# Patient Record
Sex: MALE | Race: WHITE | NOT HISPANIC OR LATINO
[De-identification: names, ages, dates, MRNs, and addresses within clinical notes are randomized per-mention and may not be internally consistent; named-entity substitution may affect disease eponyms.]

---

## 2017-12-01 ENCOUNTER — LABORATORY RESULT (OUTPATIENT)
Age: 67
End: 2017-12-01

## 2017-12-01 ENCOUNTER — NON-APPOINTMENT (OUTPATIENT)
Age: 67
End: 2017-12-01

## 2017-12-01 ENCOUNTER — APPOINTMENT (OUTPATIENT)
Dept: INTERNAL MEDICINE | Facility: CLINIC | Age: 67
End: 2017-12-01
Payer: MEDICARE

## 2017-12-01 VITALS
DIASTOLIC BLOOD PRESSURE: 79 MMHG | WEIGHT: 188 LBS | OXYGEN SATURATION: 97 % | TEMPERATURE: 97.8 F | BODY MASS INDEX: 26.32 KG/M2 | HEIGHT: 71 IN | HEART RATE: 87 BPM | SYSTOLIC BLOOD PRESSURE: 114 MMHG

## 2017-12-01 DIAGNOSIS — F31.9 BIPOLAR DISORDER, UNSPECIFIED: ICD-10-CM

## 2017-12-01 DIAGNOSIS — L02.419 CUTANEOUS ABSCESS OF LIMB, UNSPECIFIED: ICD-10-CM

## 2017-12-01 PROCEDURE — 99214 OFFICE O/P EST MOD 30 MIN: CPT | Mod: 25

## 2017-12-01 PROCEDURE — G0402 INITIAL PREVENTIVE EXAM: CPT

## 2017-12-01 PROCEDURE — 93000 ELECTROCARDIOGRAM COMPLETE: CPT | Mod: 59

## 2017-12-01 PROCEDURE — 36415 COLL VENOUS BLD VENIPUNCTURE: CPT

## 2017-12-01 PROCEDURE — G0404: CPT

## 2017-12-03 LAB
25(OH)D3 SERPL-MCNC: 40.7 NG/ML
AMYLASE/CREAT SERPL: 66 U/L
BASOPHILS # BLD AUTO: 0.03 K/UL
BASOPHILS NFR BLD AUTO: 0.3 %
CHOLEST SERPL-MCNC: 182 MG/DL
CHOLEST/HDLC SERPL: 4.9 RATIO
EOSINOPHIL # BLD AUTO: 0.21 K/UL
EOSINOPHIL NFR BLD AUTO: 1.9 %
HBA1C MFR BLD HPLC: 5.3 %
HCT VFR BLD CALC: 42.1 %
HDLC SERPL-MCNC: 37 MG/DL
HGB BLD-MCNC: 14.1 G/DL
IMM GRANULOCYTES NFR BLD AUTO: 0.3 %
LDLC SERPL CALC-MCNC: 119 MG/DL
LITHIUM SERPL-SCNC: <.2 MMOL/L
LPL SERPL-CCNC: 28 U/L
LYMPHOCYTES # BLD AUTO: 1.74 K/UL
LYMPHOCYTES NFR BLD AUTO: 15.6 %
MAN DIFF?: NORMAL
MCHC RBC-ENTMCNC: 32.3 PG
MCHC RBC-ENTMCNC: 33.5 GM/DL
MCV RBC AUTO: 96.3 FL
MONOCYTES # BLD AUTO: 1.1 K/UL
MONOCYTES NFR BLD AUTO: 9.9 %
NEUTROPHILS # BLD AUTO: 8.02 K/UL
NEUTROPHILS NFR BLD AUTO: 72 %
PLATELET # BLD AUTO: 219 K/UL
PSA SERPL-MCNC: 1.34 NG/ML
RBC # BLD: 4.37 M/UL
RBC # FLD: 13.4 %
T4 SERPL-MCNC: 7.2 UG/DL
TRIGL SERPL-MCNC: 132 MG/DL
TSH SERPL-ACNC: 1.29 UIU/ML
URATE SERPL-MCNC: 5.4 MG/DL
WBC # FLD AUTO: 11.13 K/UL

## 2017-12-21 ENCOUNTER — RX RENEWAL (OUTPATIENT)
Age: 67
End: 2017-12-21

## 2017-12-21 RX ORDER — LITHIUM CARBONATE 300 MG/1
300 CAPSULE ORAL
Qty: 120 | Refills: 5 | Status: ACTIVE | COMMUNITY
Start: 2017-12-21 | End: 1900-01-01

## 2018-05-29 ENCOUNTER — APPOINTMENT (OUTPATIENT)
Dept: INTERNAL MEDICINE | Facility: CLINIC | Age: 68
End: 2018-05-29
Payer: MEDICARE

## 2018-05-29 VITALS
SYSTOLIC BLOOD PRESSURE: 120 MMHG | HEIGHT: 71 IN | TEMPERATURE: 98.1 F | BODY MASS INDEX: 27.58 KG/M2 | HEART RATE: 67 BPM | DIASTOLIC BLOOD PRESSURE: 78 MMHG | WEIGHT: 197 LBS

## 2018-05-29 DIAGNOSIS — M62.830 MUSCLE SPASM OF BACK: ICD-10-CM

## 2018-05-29 PROCEDURE — 99213 OFFICE O/P EST LOW 20 MIN: CPT

## 2018-05-29 NOTE — PLAN
[FreeTextEntry1] : Most likely parotiditis\par \par doxycycline 100 bid for 10 days\par hot compresses\par keep mouth moist\par \par \par if not to Head and Neck\par \par \par back spasm  local care\par

## 2018-05-29 NOTE — PHYSICAL EXAM
[No Acute Distress] : no acute distress [Well Nourished] : well nourished [No JVD] : no jugular venous distention [Supple] : supple [No Respiratory Distress] : no respiratory distress  [Clear to Auscultation] : lungs were clear to auscultation bilaterally [Normal Rate] : normal rate  [Regular Rhythm] : with a regular rhythm [Soft] : abdomen soft [No HSM] : no HSM [de-identified] : swollen left cheek with 1.5 cm nodule

## 2018-05-29 NOTE — HISTORY OF PRESENT ILLNESS
[FreeTextEntry1] : swollen left cheek [de-identified] : Swollen left cheek\par several month to a year gets better and worse\par no fever or illness\par denture issue\par \par \par back issue from lifting heavy dog

## 2018-07-10 ENCOUNTER — APPOINTMENT (OUTPATIENT)
Dept: SURGERY | Facility: CLINIC | Age: 68
End: 2018-07-10
Payer: MEDICARE

## 2018-07-10 VITALS
SYSTOLIC BLOOD PRESSURE: 172 MMHG | HEART RATE: 77 BPM | WEIGHT: 200 LBS | HEIGHT: 72 IN | DIASTOLIC BLOOD PRESSURE: 96 MMHG | BODY MASS INDEX: 27.09 KG/M2

## 2018-07-10 PROCEDURE — 99203 OFFICE O/P NEW LOW 30 MIN: CPT

## 2018-07-10 PROCEDURE — 10021 FNA BX W/O IMG GDN 1ST LES: CPT

## 2018-07-11 ENCOUNTER — LABORATORY RESULT (OUTPATIENT)
Age: 68
End: 2018-07-11

## 2018-07-17 ENCOUNTER — MOBILE ON CALL (OUTPATIENT)
Age: 68
End: 2018-07-17

## 2018-07-19 ENCOUNTER — OTHER (OUTPATIENT)
Age: 68
End: 2018-07-19

## 2018-08-06 ENCOUNTER — FORM ENCOUNTER (OUTPATIENT)
Age: 68
End: 2018-08-06

## 2018-08-07 ENCOUNTER — RESULT REVIEW (OUTPATIENT)
Age: 68
End: 2018-08-07

## 2018-08-07 ENCOUNTER — OUTPATIENT (OUTPATIENT)
Dept: OUTPATIENT SERVICES | Facility: HOSPITAL | Age: 68
LOS: 1 days | End: 2018-08-07
Payer: MEDICARE

## 2018-08-07 ENCOUNTER — APPOINTMENT (OUTPATIENT)
Dept: ULTRASOUND IMAGING | Facility: IMAGING CENTER | Age: 68
End: 2018-08-07
Payer: MEDICARE

## 2018-08-07 DIAGNOSIS — Z00.8 ENCOUNTER FOR OTHER GENERAL EXAMINATION: ICD-10-CM

## 2018-08-07 PROCEDURE — 20206 BIOPSY MUSCLE PERQ NEEDLE: CPT

## 2018-08-07 PROCEDURE — 76942 ECHO GUIDE FOR BIOPSY: CPT | Mod: 26

## 2018-08-07 PROCEDURE — 88184 FLOWCYTOMETRY/ TC 1 MARKER: CPT

## 2018-08-07 PROCEDURE — 76942 ECHO GUIDE FOR BIOPSY: CPT

## 2018-08-07 PROCEDURE — 88185 FLOWCYTOMETRY/TC ADD-ON: CPT

## 2018-08-09 LAB — TM INTERPRETATION: SIGNIFICANT CHANGE UP

## 2018-08-10 LAB — NON-GYNECOLOGICAL CYTOLOGY STUDY: SIGNIFICANT CHANGE UP

## 2018-08-13 ENCOUNTER — OTHER (OUTPATIENT)
Age: 68
End: 2018-08-13

## 2018-09-05 ENCOUNTER — OUTPATIENT (OUTPATIENT)
Dept: OUTPATIENT SERVICES | Facility: HOSPITAL | Age: 68
LOS: 1 days | Discharge: HOME | End: 2018-09-05
Payer: MEDICARE

## 2018-09-05 DIAGNOSIS — C85.88 OTHER SPECIFIED TYPES OF NON-HODGKIN LYMPHOMA, LYMPH NODES OF MULTIPLE SITES: ICD-10-CM

## 2018-09-11 ENCOUNTER — APPOINTMENT (OUTPATIENT)
Dept: HEMATOLOGY ONCOLOGY | Facility: CLINIC | Age: 68
End: 2018-09-11
Payer: MEDICARE

## 2018-09-11 ENCOUNTER — RESULT REVIEW (OUTPATIENT)
Age: 68
End: 2018-09-11

## 2018-09-11 ENCOUNTER — LABORATORY RESULT (OUTPATIENT)
Age: 68
End: 2018-09-11

## 2018-09-11 VITALS
OXYGEN SATURATION: 97 % | DIASTOLIC BLOOD PRESSURE: 82 MMHG | BODY MASS INDEX: 27.78 KG/M2 | TEMPERATURE: 97.7 F | HEART RATE: 77 BPM | HEIGHT: 70.71 IN | SYSTOLIC BLOOD PRESSURE: 159 MMHG | RESPIRATION RATE: 16 BRPM | WEIGHT: 198.42 LBS

## 2018-09-11 DIAGNOSIS — Z87.898 PERSONAL HISTORY OF OTHER SPECIFIED CONDITIONS: ICD-10-CM

## 2018-09-11 DIAGNOSIS — R07.89 OTHER CHEST PAIN: ICD-10-CM

## 2018-09-11 DIAGNOSIS — Z60.2 PROBLEMS RELATED TO LIVING ALONE: ICD-10-CM

## 2018-09-11 DIAGNOSIS — R14.2 ERUCTATION: ICD-10-CM

## 2018-09-11 DIAGNOSIS — Z87.19 PERSONAL HISTORY OF OTHER DISEASES OF THE DIGESTIVE SYSTEM: ICD-10-CM

## 2018-09-11 DIAGNOSIS — F17.210 NICOTINE DEPENDENCE, CIGARETTES, UNCOMPLICATED: ICD-10-CM

## 2018-09-11 DIAGNOSIS — K11.9 DISEASE OF SALIVARY GLAND, UNSPECIFIED: ICD-10-CM

## 2018-09-11 LAB
BASOPHILS # BLD AUTO: 0.1 K/UL — SIGNIFICANT CHANGE UP (ref 0–0.2)
BASOPHILS NFR BLD AUTO: 0.6 % — SIGNIFICANT CHANGE UP (ref 0–2)
EOSINOPHIL # BLD AUTO: 0.5 K/UL — SIGNIFICANT CHANGE UP (ref 0–0.5)
EOSINOPHIL NFR BLD AUTO: 4.2 % — SIGNIFICANT CHANGE UP (ref 0–6)
ERYTHROCYTE [SEDIMENTATION RATE] IN BLOOD: 2 MM/HR — SIGNIFICANT CHANGE UP (ref 0–20)
HCT VFR BLD CALC: 44.4 % — SIGNIFICANT CHANGE UP (ref 39–50)
HGB BLD-MCNC: 15.2 G/DL — SIGNIFICANT CHANGE UP (ref 13–17)
LYMPHOCYTES # BLD AUTO: 1.8 K/UL — SIGNIFICANT CHANGE UP (ref 1–3.3)
LYMPHOCYTES # BLD AUTO: 14.8 % — SIGNIFICANT CHANGE UP (ref 13–44)
MCHC RBC-ENTMCNC: 31.3 PG — SIGNIFICANT CHANGE UP (ref 27–34)
MCHC RBC-ENTMCNC: 34.4 G/DL — SIGNIFICANT CHANGE UP (ref 32–36)
MCV RBC AUTO: 91 FL — SIGNIFICANT CHANGE UP (ref 80–100)
MONOCYTES # BLD AUTO: 0.9 K/UL — SIGNIFICANT CHANGE UP (ref 0–0.9)
MONOCYTES NFR BLD AUTO: 7.7 % — SIGNIFICANT CHANGE UP (ref 2–14)
NEUTROPHILS # BLD AUTO: 8.6 K/UL — HIGH (ref 1.8–7.4)
NEUTROPHILS NFR BLD AUTO: 72.6 % — SIGNIFICANT CHANGE UP (ref 43–77)
PLATELET # BLD AUTO: 203 K/UL — SIGNIFICANT CHANGE UP (ref 150–400)
RBC # BLD: 4.87 M/UL — SIGNIFICANT CHANGE UP (ref 4.2–5.8)
RBC # FLD: 12.5 % — SIGNIFICANT CHANGE UP (ref 10.3–14.5)
WBC # BLD: 11.8 K/UL — HIGH (ref 3.8–10.5)
WBC # FLD AUTO: 11.8 K/UL — HIGH (ref 3.8–10.5)

## 2018-09-11 PROCEDURE — 99205 OFFICE O/P NEW HI 60 MIN: CPT

## 2018-09-11 RX ORDER — DOXYCYCLINE HYCLATE 100 MG/1
100 CAPSULE ORAL
Qty: 20 | Refills: 1 | Status: DISCONTINUED | COMMUNITY
Start: 2018-05-29 | End: 2018-09-11

## 2018-09-11 RX ORDER — FAMOTIDINE 40 MG/1
40 TABLET, FILM COATED ORAL
Qty: 1 | Refills: 2 | Status: DISCONTINUED | COMMUNITY
Start: 2017-12-01 | End: 2018-09-11

## 2018-09-11 SDOH — SOCIAL STABILITY - SOCIAL INSECURITY: PROBLEMS RELATED TO LIVING ALONE: Z60.2

## 2018-09-12 LAB
ALBUMIN SERPL ELPH-MCNC: 5 G/DL
ALP BLD-CCNC: 88 U/L
ALT SERPL-CCNC: 21 U/L
ANION GAP SERPL CALC-SCNC: 13 MMOL/L
AST SERPL-CCNC: 24 U/L
B2 MICROGLOB SERPL-MCNC: 4.2 MG/L
BILIRUB SERPL-MCNC: 0.3 MG/DL
BUN SERPL-MCNC: 26 MG/DL
CALCIUM SERPL-MCNC: 10.3 MG/DL
CHLORIDE SERPL-SCNC: 106 MMOL/L
CO2 SERPL-SCNC: 22 MMOL/L
CREAT SERPL-MCNC: 1.31 MG/DL
GLUCOSE SERPL-MCNC: 102 MG/DL
HBV CORE IGG+IGM SER QL: NONREACTIVE
HBV SURFACE AB SER QL: NONREACTIVE
HBV SURFACE AG SER QL: NONREACTIVE
HCV AB SER QL: NONREACTIVE
HCV S/CO RATIO: 0.16 S/CO
HIV1+2 AB SPEC QL IA.RAPID: NONREACTIVE
LDH SERPL-CCNC: 221 U/L
POTASSIUM SERPL-SCNC: 4.4 MMOL/L
PROT SERPL-MCNC: 7.8 G/DL
SODIUM SERPL-SCNC: 141 MMOL/L
URATE SERPL-MCNC: 4.8 MG/DL

## 2018-09-14 ENCOUNTER — FORM ENCOUNTER (OUTPATIENT)
Age: 68
End: 2018-09-14

## 2018-09-15 ENCOUNTER — APPOINTMENT (OUTPATIENT)
Dept: CT IMAGING | Facility: IMAGING CENTER | Age: 68
End: 2018-09-15
Payer: MEDICARE

## 2018-09-15 ENCOUNTER — OUTPATIENT (OUTPATIENT)
Dept: OUTPATIENT SERVICES | Facility: HOSPITAL | Age: 68
LOS: 1 days | End: 2018-09-15
Payer: MEDICARE

## 2018-09-15 DIAGNOSIS — C81.90 HODGKIN LYMPHOMA, UNSPECIFIED, UNSPECIFIED SITE: ICD-10-CM

## 2018-09-15 PROCEDURE — 74177 CT ABD & PELVIS W/CONTRAST: CPT | Mod: 26

## 2018-09-15 PROCEDURE — 71260 CT THORAX DX C+: CPT | Mod: 26

## 2018-09-15 PROCEDURE — 82565 ASSAY OF CREATININE: CPT

## 2018-09-15 PROCEDURE — 70491 CT SOFT TISSUE NECK W/DYE: CPT | Mod: 26

## 2018-09-15 PROCEDURE — 74177 CT ABD & PELVIS W/CONTRAST: CPT

## 2018-09-15 PROCEDURE — 71260 CT THORAX DX C+: CPT

## 2018-09-15 PROCEDURE — 70491 CT SOFT TISSUE NECK W/DYE: CPT

## 2018-09-19 ENCOUNTER — FORM ENCOUNTER (OUTPATIENT)
Age: 68
End: 2018-09-19

## 2018-09-19 ENCOUNTER — APPOINTMENT (OUTPATIENT)
Dept: PULMONOLOGY | Facility: CLINIC | Age: 68
End: 2018-09-19
Payer: MEDICARE

## 2018-09-19 PROCEDURE — 94010 BREATHING CAPACITY TEST: CPT

## 2018-09-19 PROCEDURE — 94729 DIFFUSING CAPACITY: CPT

## 2018-09-19 PROCEDURE — 94726 PLETHYSMOGRAPHY LUNG VOLUMES: CPT

## 2018-09-20 ENCOUNTER — OUTPATIENT (OUTPATIENT)
Dept: OUTPATIENT SERVICES | Facility: HOSPITAL | Age: 68
LOS: 1 days | End: 2018-09-20
Payer: MEDICARE

## 2018-09-20 ENCOUNTER — APPOINTMENT (OUTPATIENT)
Dept: NUCLEAR MEDICINE | Facility: IMAGING CENTER | Age: 68
End: 2018-09-20
Payer: MEDICARE

## 2018-09-20 ENCOUNTER — FORM ENCOUNTER (OUTPATIENT)
Age: 68
End: 2018-09-20

## 2018-09-20 ENCOUNTER — APPOINTMENT (OUTPATIENT)
Dept: NUCLEAR MEDICINE | Facility: IMAGING CENTER | Age: 68
End: 2018-09-20

## 2018-09-20 DIAGNOSIS — C81.90 HODGKIN LYMPHOMA, UNSPECIFIED, UNSPECIFIED SITE: ICD-10-CM

## 2018-09-20 DIAGNOSIS — Z98.890 OTHER SPECIFIED POSTPROCEDURAL STATES: Chronic | ICD-10-CM

## 2018-09-20 PROCEDURE — 78472 GATED HEART PLANAR SINGLE: CPT | Mod: 26

## 2018-09-20 PROCEDURE — 78472 GATED HEART PLANAR SINGLE: CPT

## 2018-09-20 PROCEDURE — A9538: CPT

## 2018-09-20 PROCEDURE — A9560: CPT

## 2018-09-21 ENCOUNTER — APPOINTMENT (OUTPATIENT)
Dept: NUCLEAR MEDICINE | Facility: IMAGING CENTER | Age: 68
End: 2018-09-21

## 2018-09-21 ENCOUNTER — OUTPATIENT (OUTPATIENT)
Dept: OUTPATIENT SERVICES | Facility: HOSPITAL | Age: 68
LOS: 1 days | End: 2018-09-21
Payer: MEDICARE

## 2018-09-21 ENCOUNTER — APPOINTMENT (OUTPATIENT)
Dept: NUCLEAR MEDICINE | Facility: IMAGING CENTER | Age: 68
End: 2018-09-21
Payer: MEDICARE

## 2018-09-21 DIAGNOSIS — Z98.890 OTHER SPECIFIED POSTPROCEDURAL STATES: Chronic | ICD-10-CM

## 2018-09-21 DIAGNOSIS — C81.90 HODGKIN LYMPHOMA, UNSPECIFIED, UNSPECIFIED SITE: ICD-10-CM

## 2018-09-21 PROBLEM — C85.90 NON-HODGKIN LYMPHOMA, UNSPECIFIED, UNSPECIFIED SITE: Chronic | Status: ACTIVE | Noted: 2018-09-19

## 2018-09-21 PROBLEM — F31.9 BIPOLAR DISORDER, UNSPECIFIED: Chronic | Status: ACTIVE | Noted: 2018-09-19

## 2018-09-21 PROCEDURE — 78815 PET IMAGE W/CT SKULL-THIGH: CPT | Mod: 26,PI

## 2018-09-21 PROCEDURE — 78815 PET IMAGE W/CT SKULL-THIGH: CPT

## 2018-09-21 PROCEDURE — A9552: CPT

## 2018-09-25 ENCOUNTER — APPOINTMENT (OUTPATIENT)
Dept: HEMATOLOGY ONCOLOGY | Facility: CLINIC | Age: 68
End: 2018-09-25
Payer: MEDICARE

## 2018-09-25 ENCOUNTER — APPOINTMENT (OUTPATIENT)
Dept: INFUSION THERAPY | Facility: HOSPITAL | Age: 68
End: 2018-09-25

## 2018-09-25 ENCOUNTER — RESULT REVIEW (OUTPATIENT)
Age: 68
End: 2018-09-25

## 2018-09-25 VITALS
BODY MASS INDEX: 27.84 KG/M2 | WEIGHT: 198 LBS | RESPIRATION RATE: 16 BRPM | SYSTOLIC BLOOD PRESSURE: 118 MMHG | OXYGEN SATURATION: 98 % | DIASTOLIC BLOOD PRESSURE: 78 MMHG | HEART RATE: 65 BPM | TEMPERATURE: 98.1 F

## 2018-09-25 DIAGNOSIS — R94.30 ABNORMAL RESULT OF CARDIOVASCULAR FUNCTION STUDY, UNSPECIFIED: ICD-10-CM

## 2018-09-25 LAB
BASOPHILS # BLD AUTO: 0.1 K/UL — SIGNIFICANT CHANGE UP (ref 0–0.2)
BASOPHILS NFR BLD AUTO: 0.6 % — SIGNIFICANT CHANGE UP (ref 0–2)
EOSINOPHIL # BLD AUTO: 0.8 K/UL — HIGH (ref 0–0.5)
EOSINOPHIL NFR BLD AUTO: 7.7 % — HIGH (ref 0–6)
HCT VFR BLD CALC: 44.8 % — SIGNIFICANT CHANGE UP (ref 39–50)
HGB BLD-MCNC: 15.5 G/DL — SIGNIFICANT CHANGE UP (ref 13–17)
LYMPHOCYTES # BLD AUTO: 1.8 K/UL — SIGNIFICANT CHANGE UP (ref 1–3.3)
LYMPHOCYTES # BLD AUTO: 18.1 % — SIGNIFICANT CHANGE UP (ref 13–44)
MCHC RBC-ENTMCNC: 31.5 PG — SIGNIFICANT CHANGE UP (ref 27–34)
MCHC RBC-ENTMCNC: 34.7 G/DL — SIGNIFICANT CHANGE UP (ref 32–36)
MCV RBC AUTO: 90.9 FL — SIGNIFICANT CHANGE UP (ref 80–100)
MONOCYTES # BLD AUTO: 0.8 K/UL — SIGNIFICANT CHANGE UP (ref 0–0.9)
MONOCYTES NFR BLD AUTO: 8.2 % — SIGNIFICANT CHANGE UP (ref 2–14)
NEUTROPHILS # BLD AUTO: 6.6 K/UL — SIGNIFICANT CHANGE UP (ref 1.8–7.4)
NEUTROPHILS NFR BLD AUTO: 65.5 % — SIGNIFICANT CHANGE UP (ref 43–77)
PLATELET # BLD AUTO: 207 K/UL — SIGNIFICANT CHANGE UP (ref 150–400)
RBC # BLD: 4.93 M/UL — SIGNIFICANT CHANGE UP (ref 4.2–5.8)
RBC # FLD: 12.4 % — SIGNIFICANT CHANGE UP (ref 10.3–14.5)
WBC # BLD: 10.1 K/UL — SIGNIFICANT CHANGE UP (ref 3.8–10.5)
WBC # FLD AUTO: 10.1 K/UL — SIGNIFICANT CHANGE UP (ref 3.8–10.5)

## 2018-09-25 PROCEDURE — 93010 ELECTROCARDIOGRAM REPORT: CPT

## 2018-09-25 PROCEDURE — 99214 OFFICE O/P EST MOD 30 MIN: CPT

## 2018-09-26 ENCOUNTER — APPOINTMENT (OUTPATIENT)
Dept: INFUSION THERAPY | Facility: HOSPITAL | Age: 68
End: 2018-09-26

## 2018-09-26 ENCOUNTER — LABORATORY RESULT (OUTPATIENT)
Age: 68
End: 2018-09-26

## 2018-10-02 ENCOUNTER — APPOINTMENT (OUTPATIENT)
Dept: SURGERY | Facility: CLINIC | Age: 68
End: 2018-10-02
Payer: MEDICARE

## 2018-10-02 PROCEDURE — 99213 OFFICE O/P EST LOW 20 MIN: CPT

## 2018-10-05 ENCOUNTER — OUTPATIENT (OUTPATIENT)
Dept: OUTPATIENT SERVICES | Facility: HOSPITAL | Age: 68
LOS: 1 days | End: 2018-10-05

## 2018-10-05 ENCOUNTER — APPOINTMENT (OUTPATIENT)
Dept: CARDIOLOGY | Facility: CLINIC | Age: 68
End: 2018-10-05

## 2018-10-05 ENCOUNTER — APPOINTMENT (OUTPATIENT)
Dept: CV DIAGNOSITCS | Facility: HOSPITAL | Age: 68
End: 2018-10-05
Payer: MEDICARE

## 2018-10-05 DIAGNOSIS — Z98.890 OTHER SPECIFIED POSTPROCEDURAL STATES: Chronic | ICD-10-CM

## 2018-10-05 DIAGNOSIS — R94.30 ABNORMAL RESULT OF CARDIOVASCULAR FUNCTION STUDY, UNSPECIFIED: ICD-10-CM

## 2018-10-05 PROCEDURE — 93306 TTE W/DOPPLER COMPLETE: CPT | Mod: 26

## 2018-10-08 ENCOUNTER — OUTPATIENT (OUTPATIENT)
Dept: OUTPATIENT SERVICES | Facility: HOSPITAL | Age: 68
LOS: 1 days | Discharge: HOME | End: 2018-10-08

## 2018-10-08 ENCOUNTER — TRANSCRIPTION ENCOUNTER (OUTPATIENT)
Age: 68
End: 2018-10-08

## 2018-10-08 ENCOUNTER — INPATIENT (INPATIENT)
Facility: HOSPITAL | Age: 68
LOS: 3 days | Discharge: HOME | End: 2018-10-12
Attending: SURGERY | Admitting: SURGERY
Payer: MEDICARE

## 2018-10-08 VITALS
RESPIRATION RATE: 16 BRPM | TEMPERATURE: 99 F | OXYGEN SATURATION: 95 % | DIASTOLIC BLOOD PRESSURE: 88 MMHG | SYSTOLIC BLOOD PRESSURE: 110 MMHG | HEART RATE: 83 BPM

## 2018-10-08 DIAGNOSIS — C85.88 OTHER SPECIFIED TYPES OF NON-HODGKIN LYMPHOMA, LYMPH NODES OF MULTIPLE SITES: ICD-10-CM

## 2018-10-08 DIAGNOSIS — Z98.890 OTHER SPECIFIED POSTPROCEDURAL STATES: Chronic | ICD-10-CM

## 2018-10-08 LAB
ALBUMIN SERPL ELPH-MCNC: 4.5 G/DL — SIGNIFICANT CHANGE UP (ref 3.3–5)
ALP SERPL-CCNC: 94 U/L — SIGNIFICANT CHANGE UP (ref 40–120)
ALT FLD-CCNC: 15 U/L — SIGNIFICANT CHANGE UP (ref 4–41)
APPEARANCE UR: CLEAR — SIGNIFICANT CHANGE UP
AST SERPL-CCNC: 23 U/L — SIGNIFICANT CHANGE UP (ref 4–40)
BASOPHILS # BLD AUTO: 0.05 K/UL — SIGNIFICANT CHANGE UP (ref 0–0.2)
BASOPHILS NFR BLD AUTO: 0.2 % — SIGNIFICANT CHANGE UP (ref 0–2)
BILIRUB SERPL-MCNC: 1 MG/DL — SIGNIFICANT CHANGE UP (ref 0.2–1.2)
BILIRUB UR-MCNC: NEGATIVE — SIGNIFICANT CHANGE UP
BLOOD UR QL VISUAL: SIGNIFICANT CHANGE UP
BUN SERPL-MCNC: 34 MG/DL — HIGH (ref 7–23)
CALCIUM SERPL-MCNC: 10.6 MG/DL — HIGH (ref 8.4–10.5)
CHLORIDE SERPL-SCNC: 99 MMOL/L — SIGNIFICANT CHANGE UP (ref 98–107)
CO2 SERPL-SCNC: 20 MMOL/L — LOW (ref 22–31)
COLOR SPEC: YELLOW — SIGNIFICANT CHANGE UP
CREAT SERPL-MCNC: 1.82 MG/DL — HIGH (ref 0.5–1.3)
EOSINOPHIL # BLD AUTO: 0 K/UL — SIGNIFICANT CHANGE UP (ref 0–0.5)
EOSINOPHIL NFR BLD AUTO: 0 % — SIGNIFICANT CHANGE UP (ref 0–6)
GLUCOSE SERPL-MCNC: 142 MG/DL — HIGH (ref 70–99)
GLUCOSE UR-MCNC: NEGATIVE — SIGNIFICANT CHANGE UP
HCT VFR BLD CALC: 49.4 % — SIGNIFICANT CHANGE UP (ref 39–50)
HGB BLD-MCNC: 16.4 G/DL — SIGNIFICANT CHANGE UP (ref 13–17)
IMM GRANULOCYTES # BLD AUTO: 0.27 # — SIGNIFICANT CHANGE UP
IMM GRANULOCYTES NFR BLD AUTO: 1.2 % — SIGNIFICANT CHANGE UP (ref 0–1.5)
KETONES UR-MCNC: SIGNIFICANT CHANGE UP
LEUKOCYTE ESTERASE UR-ACNC: NEGATIVE — SIGNIFICANT CHANGE UP
LIDOCAIN IGE QN: 17.9 U/L — SIGNIFICANT CHANGE UP (ref 7–60)
LYMPHOCYTES # BLD AUTO: 1.21 K/UL — SIGNIFICANT CHANGE UP (ref 1–3.3)
LYMPHOCYTES # BLD AUTO: 5.2 % — LOW (ref 13–44)
MCHC RBC-ENTMCNC: 31.5 PG — SIGNIFICANT CHANGE UP (ref 27–34)
MCHC RBC-ENTMCNC: 33.2 % — SIGNIFICANT CHANGE UP (ref 32–36)
MCV RBC AUTO: 94.8 FL — SIGNIFICANT CHANGE UP (ref 80–100)
MONOCYTES # BLD AUTO: 1.4 K/UL — HIGH (ref 0–0.9)
MONOCYTES NFR BLD AUTO: 6 % — SIGNIFICANT CHANGE UP (ref 2–14)
NEUTROPHILS # BLD AUTO: 20.49 K/UL — HIGH (ref 1.8–7.4)
NEUTROPHILS NFR BLD AUTO: 87.4 % — HIGH (ref 43–77)
NITRITE UR-MCNC: NEGATIVE — SIGNIFICANT CHANGE UP
NRBC # FLD: 0 — SIGNIFICANT CHANGE UP
PH UR: 6 — SIGNIFICANT CHANGE UP (ref 5–8)
PLATELET # BLD AUTO: 218 K/UL — SIGNIFICANT CHANGE UP (ref 150–400)
PMV BLD: 10.8 FL — SIGNIFICANT CHANGE UP (ref 7–13)
POTASSIUM SERPL-MCNC: 4.2 MMOL/L — SIGNIFICANT CHANGE UP (ref 3.5–5.3)
POTASSIUM SERPL-SCNC: 4.2 MMOL/L — SIGNIFICANT CHANGE UP (ref 3.5–5.3)
PROT SERPL-MCNC: 8.9 G/DL — HIGH (ref 6–8.3)
PROT UR-MCNC: 200 — HIGH
RBC # BLD: 5.21 M/UL — SIGNIFICANT CHANGE UP (ref 4.2–5.8)
RBC # FLD: 13.3 % — SIGNIFICANT CHANGE UP (ref 10.3–14.5)
SODIUM SERPL-SCNC: 137 MMOL/L — SIGNIFICANT CHANGE UP (ref 135–145)
SP GR SPEC: 1.02 — SIGNIFICANT CHANGE UP (ref 1–1.04)
UROBILINOGEN FLD QL: NORMAL — SIGNIFICANT CHANGE UP
WBC # BLD: 23.42 K/UL — HIGH (ref 3.8–10.5)
WBC # FLD AUTO: 23.42 K/UL — HIGH (ref 3.8–10.5)

## 2018-10-08 RX ORDER — MORPHINE SULFATE 50 MG/1
4 CAPSULE, EXTENDED RELEASE ORAL ONCE
Qty: 0 | Refills: 0 | Status: DISCONTINUED | OUTPATIENT
Start: 2018-10-08 | End: 2018-10-08

## 2018-10-08 RX ORDER — VANCOMYCIN HCL 1 G
1000 VIAL (EA) INTRAVENOUS ONCE
Qty: 0 | Refills: 0 | Status: COMPLETED | OUTPATIENT
Start: 2018-10-08 | End: 2018-10-08

## 2018-10-08 RX ORDER — PIPERACILLIN AND TAZOBACTAM 4; .5 G/20ML; G/20ML
3.38 INJECTION, POWDER, LYOPHILIZED, FOR SOLUTION INTRAVENOUS ONCE
Qty: 0 | Refills: 0 | Status: COMPLETED | OUTPATIENT
Start: 2018-10-08 | End: 2018-10-08

## 2018-10-08 RX ORDER — SODIUM CHLORIDE 9 MG/ML
1000 INJECTION INTRAMUSCULAR; INTRAVENOUS; SUBCUTANEOUS ONCE
Qty: 0 | Refills: 0 | Status: COMPLETED | OUTPATIENT
Start: 2018-10-08 | End: 2018-10-08

## 2018-10-08 RX ADMIN — PIPERACILLIN AND TAZOBACTAM 3.38 GRAM(S): 4; .5 INJECTION, POWDER, LYOPHILIZED, FOR SOLUTION INTRAVENOUS at 21:07

## 2018-10-08 RX ADMIN — Medication 250 MILLIGRAM(S): at 21:07

## 2018-10-08 RX ADMIN — PIPERACILLIN AND TAZOBACTAM 200 GRAM(S): 4; .5 INJECTION, POWDER, LYOPHILIZED, FOR SOLUTION INTRAVENOUS at 20:44

## 2018-10-08 RX ADMIN — MORPHINE SULFATE 4 MILLIGRAM(S): 50 CAPSULE, EXTENDED RELEASE ORAL at 22:15

## 2018-10-08 RX ADMIN — SODIUM CHLORIDE 2000 MILLILITER(S): 9 INJECTION INTRAMUSCULAR; INTRAVENOUS; SUBCUTANEOUS at 20:31

## 2018-10-08 NOTE — ED PROVIDER NOTE - PHYSICAL EXAMINATION
EM PGY1 Jina Mi MD:   CONSTITUTIONAL: Nontoxic, well nourished, obese elderly male, resting comfortably in no acute distress  HEAD: Normocephalic; atraumatic  EYES: Normal inspection, EOMI  ENMT: External appears normal; dry mucous membranes, no erythema of the oropharynx  NECK: Supple; non-tender; no cervical lymphadenopathy  CARD: RRR; no audible murmurs, rubs, or gallops  RESP: No respiratory distress, lungs ctab/l  ABD: Soft, non-distended; minimally tender in lower abdomen; no rebound or guarding, ventral hernia  EXT: No LE pitting edema or calf tenderness; distal pulses intact with good capillary refill  SKIN: Warm, dry, intact  NEURO: aaox3, no gross motor or sensory defects noted

## 2018-10-08 NOTE — ED PROVIDER NOTE - OBJECTIVE STATEMENT
EM PGY1 Jina Mi MD: 68yo male with PMH of Hodgkin's lymphoma (diagnosed by PET scan 3 weeks ago, not on chemo/radiation), bipolar disorder, diverticulosis? who presents with lower abdominal pain for 3 days. Pain is intermittently sharp, started 6/10, now 10/10 with bloating, worsens with PO intake and movement. Sent by PMD to r/o diverticulitis. Pt states that PMH saw diverticulosis? on PET scan, was not prescribed any antibiotics. Last BM was nonbloody diarrhea this AM after taking milk of magnesia. Pt did not take any other medications. Denies fever, chills, SOB, CP, back pain, lightheadedness, syncope, dysuria, hematuria, hematochezia, melena. EM PGY1 Jina Mi MD: 66yo male with PMH of Hodgkin's lymphoma (diagnosed by PET scan 3 weeks ago, not on chemo/radiation), bipolar disorder, diverticulosis who presents with lower abdominal pain for 3 days. Pain is intermittently sharp, started 6/10, now 10/10 with bloating, worsens with PO intake and movement. Sent by PMD to r/o diverticulitis. Pt states that PMH saw diverticula on PET scan, was not prescribed any antibiotics. Last BM was nonbloody diarrhea this AM after taking milk of magnesia. Pt did not take any other medications. Denies fever, chills, SOB, CP, back pain, lightheadedness, syncope, dysuria, hematuria, hematochezia, melena.

## 2018-10-08 NOTE — ED PROVIDER NOTE - NS ED ROS FT
EM PGY1 Jina Mi MD:   General: denies fever, chills  HENT: denies nasal congestion, sore throat, rhinorrhea  Eyes: denies vision changes  CV: denies chest pain  Resp: denies difficulty breathing, cough  Abdominal: denies nausea, vomiting, blood in stool, dark stool, +diarrhea, +abdominal pain,   : denies pain with urination  Neuro: denies headaches, numbness, tingling, dizziness, lightheadedness.  Skin: denies new rashes  Endocrine: denies recent weight loss

## 2018-10-08 NOTE — ED PROVIDER NOTE - ATTENDING CONTRIBUTION TO CARE
I, Jennifer Cabot, MD, have performed a history and physical exam of the patient and discussed their management with the resident. I reviewed the resident's note and agree with the documented findings and plan of care. My medical decision making and observations are found above.    Cabot: 67M with PMH of Hodgkin's lymphoma (diagnosed by PET scan 3 weeks ago, not on chemo/radiation), bipolar disorder, diverticulosis? who was sent in for lower abd pain x 3 days and concern for diverticulitis. No F/C/N/V/D/urinary sx.  On exam, HDS, NAD, MMM, eyes clear, lungs CTAB, heart sounds normal, abd soft, ND, TTP in the suprapubic region and BLQ, no G/R, no CVAT, LEs without edema, wwp, skin normal temperature and color, neuro: alert and oriented, no focal deficits. Will check basic labs, cultures, UA, CT A/P, give abx.

## 2018-10-08 NOTE — ED PROVIDER NOTE - MEDICAL DECISION MAKING DETAILS
EM PGY1 Jina Mi MD: 68yo male with PMH of Hodgkin's lymphoma (diagnosed by PET scan 3 weeks ago, not on chemo/radiation), bipolar disorder, diverticulosis? who presents with lower abdominal pain for 3 days. Pt is tachycardic at 101, otherwise afebrile. Minimally tender on exam in lower abd. R/o diverticulitis vs other. Plan: labs, cultures, UA, CT abd/pelvis, fluids, pain control. EM PGY1 Jina Mi MD: 66yo male with PMH of Hodgkin's lymphoma (diagnosed by PET scan 3 weeks ago, not on chemo/radiation), bipolar disorder, diverticulosis? who presents with lower abdominal pain for 3 days. Pt is tachycardic at 101, otherwise afebrile. Minimally tender on exam in lower abd. R/o diverticulitis vs other. Plan: labs, cultures, UA, CT abd/pelvis, fluids, pain control.    Cabot: 67M with PMH of Hodgkin's lymphoma (diagnosed by PET scan 3 weeks ago, not on chemo/radiation), bipolar disorder, diverticulosis? who was sent in for lower abd pain x 3 days and concern for diverticulitis. No F/C/N/V/D/urinary sx.  On exam, HDS, NAD, MMM, eyes clear, lungs CTAB, heart sounds normal, abd soft, ND, TTP in the suprapubic region and BLQ, no G/R, no CVAT, LEs without edema, wwp, skin normal temperature and color, neuro: alert and oriented, no focal deficits. Will check basic labs, cultures, UA, CT A/P, give abx. EM PGY1 Jina Mi MD: 66yo male with PMH of Hodgkin's lymphoma (diagnosed by PET scan 3 weeks ago, not on chemo/radiation), bipolar disorder, diverticulosis who presents with lower abdominal pain for 3 days. Pt is tachycardic at 101, otherwise afebrile. Minimally tender on exam in lower abd. R/o diverticulitis vs other. Plan: labs, cultures, UA, CT abd/pelvis, fluids, pain control.    Cabot: 67M with PMH of Hodgkin's lymphoma (diagnosed by PET scan 3 weeks ago, not on chemo/radiation), bipolar disorder, diverticulosis? who was sent in for lower abd pain x 3 days and concern for diverticulitis. No F/C/N/V/D/urinary sx.  On exam, HDS, NAD, MMM, eyes clear, lungs CTAB, heart sounds normal, abd soft, ND, TTP in the suprapubic region and BLQ, no G/R, no CVAT, LEs without edema, wwp, skin normal temperature and color, neuro: alert and oriented, no focal deficits. Will check basic labs, cultures, UA, CT A/P, give abx.

## 2018-10-08 NOTE — ED ADULT NURSE NOTE - OBJECTIVE STATEMENT
Nicolasa RN: Pt received to room 6 complaining of abdominal pain since saturday. Pt states he was sent by his PCP for possible diverticulitis. Pt states he was diagnosed with lymphoma. Pt states he started having diarrhea today. Pt denies chest pain, SOB, N/V, fever or chills. IV acces obtained, labs drawn and sent. Report given to primary RN.

## 2018-10-08 NOTE — ED PROVIDER NOTE - PROGRESS NOTE DETAILS
EM PGY1 Jina Mi MD: Spoke to radiologist on the phone. Likely contained perforation of appendicitis vs diverticulitis. Will consult surgery.  Serial abdominal exam with increased tenderness in lower abdomen, guarding. EM PGY1 Jina Mi MD: Report reads perforated appendicitis with periappendiceal abscess. Surgery will see pt in the ED.

## 2018-10-08 NOTE — ED ADULT NURSE NOTE - NSIMPLEMENTINTERV_GEN_ALL_ED
Implemented All Universal Safety Interventions:  [place] to call system. Call bell, personal items and telephone within reach. Instruct patient to call for assistance. Room bathroom lighting operational. Non-slip footwear when patient is off stretcher. Physically safe environment: no spills, clutter or unnecessary equipment. Stretcher in lowest position, wheels locked, appropriate side rails in place.

## 2018-10-09 ENCOUNTER — APPOINTMENT (OUTPATIENT)
Dept: INFUSION THERAPY | Facility: HOSPITAL | Age: 68
End: 2018-10-09

## 2018-10-09 ENCOUNTER — RESULT REVIEW (OUTPATIENT)
Age: 68
End: 2018-10-09

## 2018-10-09 DIAGNOSIS — K35.80 UNSPECIFIED ACUTE APPENDICITIS: ICD-10-CM

## 2018-10-09 LAB
ALBUMIN SERPL ELPH-MCNC: 3.7 G/DL — SIGNIFICANT CHANGE UP (ref 3.3–5)
ALP SERPL-CCNC: 73 U/L — SIGNIFICANT CHANGE UP (ref 40–120)
ALT FLD-CCNC: 11 U/L — SIGNIFICANT CHANGE UP (ref 4–41)
APTT BLD: 26.5 SEC — LOW (ref 27.5–37.4)
AST SERPL-CCNC: 15 U/L — SIGNIFICANT CHANGE UP (ref 4–40)
BASE EXCESS BLDV CALC-SCNC: 1.1 MMOL/L — SIGNIFICANT CHANGE UP
BASOPHILS # BLD AUTO: 0.04 K/UL — SIGNIFICANT CHANGE UP (ref 0–0.2)
BASOPHILS NFR BLD AUTO: 0.2 % — SIGNIFICANT CHANGE UP (ref 0–2)
BILIRUB SERPL-MCNC: 0.8 MG/DL — SIGNIFICANT CHANGE UP (ref 0.2–1.2)
BLD GP AB SCN SERPL QL: NEGATIVE — SIGNIFICANT CHANGE UP
BLOOD GAS VENOUS - CREATININE: 1.6 MG/DL — HIGH (ref 0.5–1.3)
BUN SERPL-MCNC: 35 MG/DL — HIGH (ref 7–23)
CALCIUM SERPL-MCNC: 10 MG/DL — SIGNIFICANT CHANGE UP (ref 8.4–10.5)
CHLORIDE BLDV-SCNC: 109 MMOL/L — HIGH (ref 96–108)
CHLORIDE SERPL-SCNC: 104 MMOL/L — SIGNIFICANT CHANGE UP (ref 98–107)
CO2 SERPL-SCNC: 23 MMOL/L — SIGNIFICANT CHANGE UP (ref 22–31)
CREAT SERPL-MCNC: 1.61 MG/DL — HIGH (ref 0.5–1.3)
EOSINOPHIL # BLD AUTO: 0.07 K/UL — SIGNIFICANT CHANGE UP (ref 0–0.5)
EOSINOPHIL NFR BLD AUTO: 0.4 % — SIGNIFICANT CHANGE UP (ref 0–6)
GAS PNL BLDV: 135 MMOL/L — LOW (ref 136–146)
GLUCOSE BLDV-MCNC: 94 — SIGNIFICANT CHANGE UP (ref 70–99)
GLUCOSE SERPL-MCNC: 111 MG/DL — HIGH (ref 70–99)
GRAM STN WND: SIGNIFICANT CHANGE UP
HCO3 BLDV-SCNC: 25 MMOL/L — SIGNIFICANT CHANGE UP (ref 20–27)
HCT VFR BLD CALC: 42.5 % — SIGNIFICANT CHANGE UP (ref 39–50)
HCT VFR BLDV CALC: 43.2 % — SIGNIFICANT CHANGE UP (ref 39–51)
HGB BLD-MCNC: 14 G/DL — SIGNIFICANT CHANGE UP (ref 13–17)
HGB BLDV-MCNC: 14.1 G/DL — SIGNIFICANT CHANGE UP (ref 13–17)
IMM GRANULOCYTES # BLD AUTO: 0.09 # — SIGNIFICANT CHANGE UP
IMM GRANULOCYTES NFR BLD AUTO: 0.6 % — SIGNIFICANT CHANGE UP (ref 0–1.5)
INR BLD: 1.1 — SIGNIFICANT CHANGE UP (ref 0.88–1.17)
LACTATE BLDV-MCNC: 1.6 MMOL/L — SIGNIFICANT CHANGE UP (ref 0.5–2)
LYMPHOCYTES # BLD AUTO: 0.95 K/UL — LOW (ref 1–3.3)
LYMPHOCYTES # BLD AUTO: 5.9 % — LOW (ref 13–44)
MAGNESIUM SERPL-MCNC: 2.9 MG/DL — HIGH (ref 1.6–2.6)
MCHC RBC-ENTMCNC: 31.5 PG — SIGNIFICANT CHANGE UP (ref 27–34)
MCHC RBC-ENTMCNC: 32.9 % — SIGNIFICANT CHANGE UP (ref 32–36)
MCV RBC AUTO: 95.7 FL — SIGNIFICANT CHANGE UP (ref 80–100)
MONOCYTES # BLD AUTO: 0.92 K/UL — HIGH (ref 0–0.9)
MONOCYTES NFR BLD AUTO: 5.7 % — SIGNIFICANT CHANGE UP (ref 2–14)
NEUTROPHILS # BLD AUTO: 14.12 K/UL — HIGH (ref 1.8–7.4)
NEUTROPHILS NFR BLD AUTO: 87.2 % — HIGH (ref 43–77)
NRBC # FLD: 0 — SIGNIFICANT CHANGE UP
PCO2 BLDV: 41 MMHG — SIGNIFICANT CHANGE UP (ref 41–51)
PH BLDV: 7.41 PH — SIGNIFICANT CHANGE UP (ref 7.32–7.43)
PHOSPHATE SERPL-MCNC: 2.4 MG/DL — LOW (ref 2.5–4.5)
PLATELET # BLD AUTO: 172 K/UL — SIGNIFICANT CHANGE UP (ref 150–400)
PMV BLD: 10.7 FL — SIGNIFICANT CHANGE UP (ref 7–13)
PO2 BLDV: 35 MMHG — SIGNIFICANT CHANGE UP (ref 35–40)
POTASSIUM BLDV-SCNC: 3.8 MMOL/L — SIGNIFICANT CHANGE UP (ref 3.4–4.5)
POTASSIUM SERPL-MCNC: 4.5 MMOL/L — SIGNIFICANT CHANGE UP (ref 3.5–5.3)
POTASSIUM SERPL-SCNC: 4.5 MMOL/L — SIGNIFICANT CHANGE UP (ref 3.5–5.3)
PROT SERPL-MCNC: 7.5 G/DL — SIGNIFICANT CHANGE UP (ref 6–8.3)
PROTHROM AB SERPL-ACNC: 12.7 SEC — SIGNIFICANT CHANGE UP (ref 9.8–13.1)
RBC # BLD: 4.44 M/UL — SIGNIFICANT CHANGE UP (ref 4.2–5.8)
RBC # FLD: 13.4 % — SIGNIFICANT CHANGE UP (ref 10.3–14.5)
RH IG SCN BLD-IMP: NEGATIVE — SIGNIFICANT CHANGE UP
RH IG SCN BLD-IMP: NEGATIVE — SIGNIFICANT CHANGE UP
SAO2 % BLDV: 63.7 % — SIGNIFICANT CHANGE UP (ref 60–85)
SODIUM SERPL-SCNC: 139 MMOL/L — SIGNIFICANT CHANGE UP (ref 135–145)
SPECIMEN SOURCE: SIGNIFICANT CHANGE UP
WBC # BLD: 16.19 K/UL — HIGH (ref 3.8–10.5)
WBC # FLD AUTO: 16.19 K/UL — HIGH (ref 3.8–10.5)

## 2018-10-09 PROCEDURE — 88304 TISSUE EXAM BY PATHOLOGIST: CPT | Mod: 26

## 2018-10-09 PROCEDURE — 74176 CT ABD & PELVIS W/O CONTRAST: CPT | Mod: 26

## 2018-10-09 RX ORDER — ACETAMINOPHEN 500 MG
1000 TABLET ORAL ONCE
Qty: 0 | Refills: 0 | Status: COMPLETED | OUTPATIENT
Start: 2018-10-10 | End: 2018-10-10

## 2018-10-09 RX ORDER — SODIUM CHLORIDE 9 MG/ML
1000 INJECTION, SOLUTION INTRAVENOUS ONCE
Qty: 0 | Refills: 0 | Status: COMPLETED | OUTPATIENT
Start: 2018-10-09 | End: 2018-10-09

## 2018-10-09 RX ORDER — ONDANSETRON 8 MG/1
4 TABLET, FILM COATED ORAL ONCE
Qty: 0 | Refills: 0 | Status: DISCONTINUED | OUTPATIENT
Start: 2018-10-09 | End: 2018-10-09

## 2018-10-09 RX ORDER — INFLUENZA VIRUS VACCINE 15; 15; 15; 15 UG/.5ML; UG/.5ML; UG/.5ML; UG/.5ML
0.5 SUSPENSION INTRAMUSCULAR ONCE
Qty: 0 | Refills: 0 | Status: DISCONTINUED | OUTPATIENT
Start: 2018-10-09 | End: 2018-10-12

## 2018-10-09 RX ORDER — OXYCODONE HYDROCHLORIDE 5 MG/1
5 TABLET ORAL EVERY 4 HOURS
Qty: 0 | Refills: 0 | Status: DISCONTINUED | OUTPATIENT
Start: 2018-10-09 | End: 2018-10-12

## 2018-10-09 RX ORDER — SODIUM CHLORIDE 9 MG/ML
1000 INJECTION, SOLUTION INTRAVENOUS
Qty: 0 | Refills: 0 | Status: DISCONTINUED | OUTPATIENT
Start: 2018-10-09 | End: 2018-10-09

## 2018-10-09 RX ORDER — ACETAMINOPHEN 500 MG
1000 TABLET ORAL ONCE
Qty: 0 | Refills: 0 | Status: COMPLETED | OUTPATIENT
Start: 2018-10-09 | End: 2018-10-09

## 2018-10-09 RX ORDER — FENTANYL CITRATE 50 UG/ML
25 INJECTION INTRAVENOUS
Qty: 0 | Refills: 0 | Status: DISCONTINUED | OUTPATIENT
Start: 2018-10-09 | End: 2018-10-09

## 2018-10-09 RX ORDER — ACETAMINOPHEN 500 MG
1000 TABLET ORAL ONCE
Qty: 0 | Refills: 0 | Status: DISCONTINUED | OUTPATIENT
Start: 2018-10-10 | End: 2018-10-10

## 2018-10-09 RX ORDER — ACETAMINOPHEN 500 MG
1000 TABLET ORAL ONCE
Qty: 0 | Refills: 0 | Status: COMPLETED | OUTPATIENT
Start: 2018-10-09 | End: 2018-10-10

## 2018-10-09 RX ORDER — FENTANYL CITRATE 50 UG/ML
50 INJECTION INTRAVENOUS
Qty: 0 | Refills: 0 | Status: DISCONTINUED | OUTPATIENT
Start: 2018-10-09 | End: 2018-10-09

## 2018-10-09 RX ORDER — OXYCODONE HYDROCHLORIDE 5 MG/1
10 TABLET ORAL EVERY 4 HOURS
Qty: 0 | Refills: 0 | Status: DISCONTINUED | OUTPATIENT
Start: 2018-10-09 | End: 2018-10-12

## 2018-10-09 RX ORDER — PIPERACILLIN AND TAZOBACTAM 4; .5 G/20ML; G/20ML
3.38 INJECTION, POWDER, LYOPHILIZED, FOR SOLUTION INTRAVENOUS EVERY 8 HOURS
Qty: 0 | Refills: 0 | Status: DISCONTINUED | OUTPATIENT
Start: 2018-10-09 | End: 2018-10-12

## 2018-10-09 RX ORDER — SODIUM CHLORIDE 9 MG/ML
1000 INJECTION, SOLUTION INTRAVENOUS
Qty: 0 | Refills: 0 | Status: DISCONTINUED | OUTPATIENT
Start: 2018-10-09 | End: 2018-10-10

## 2018-10-09 RX ORDER — ENOXAPARIN SODIUM 100 MG/ML
40 INJECTION SUBCUTANEOUS EVERY 24 HOURS
Qty: 0 | Refills: 0 | Status: DISCONTINUED | OUTPATIENT
Start: 2018-10-09 | End: 2018-10-12

## 2018-10-09 RX ADMIN — SODIUM CHLORIDE 100 MILLILITER(S): 9 INJECTION, SOLUTION INTRAVENOUS at 02:54

## 2018-10-09 RX ADMIN — SODIUM CHLORIDE 2000 MILLILITER(S): 9 INJECTION, SOLUTION INTRAVENOUS at 09:40

## 2018-10-09 RX ADMIN — PIPERACILLIN AND TAZOBACTAM 25 GRAM(S): 4; .5 INJECTION, POWDER, LYOPHILIZED, FOR SOLUTION INTRAVENOUS at 21:47

## 2018-10-09 RX ADMIN — MORPHINE SULFATE 4 MILLIGRAM(S): 50 CAPSULE, EXTENDED RELEASE ORAL at 00:00

## 2018-10-09 RX ADMIN — FENTANYL CITRATE 50 MICROGRAM(S): 50 INJECTION INTRAVENOUS at 15:15

## 2018-10-09 RX ADMIN — Medication 400 MILLIGRAM(S): at 21:44

## 2018-10-09 RX ADMIN — SODIUM CHLORIDE 150 MILLILITER(S): 9 INJECTION, SOLUTION INTRAVENOUS at 10:33

## 2018-10-09 RX ADMIN — SODIUM CHLORIDE 100 MILLILITER(S): 9 INJECTION, SOLUTION INTRAVENOUS at 15:04

## 2018-10-09 RX ADMIN — FENTANYL CITRATE 50 MICROGRAM(S): 50 INJECTION INTRAVENOUS at 15:00

## 2018-10-09 RX ADMIN — SODIUM CHLORIDE 1000 MILLILITER(S): 9 INJECTION, SOLUTION INTRAVENOUS at 02:54

## 2018-10-09 RX ADMIN — SODIUM CHLORIDE 2000 MILLILITER(S): 9 INJECTION, SOLUTION INTRAVENOUS at 02:09

## 2018-10-09 RX ADMIN — Medication 1000 MILLIGRAM(S): at 00:00

## 2018-10-09 RX ADMIN — Medication 1000 MILLIGRAM(S): at 22:15

## 2018-10-09 NOTE — PROGRESS NOTE ADULT - ASSESSMENT
A/P: 66 yo M s/p lap appendectomy for perforated appendicitis wit abscess  -Cont CLD. not yet toerlating diet. Adv to Reg diet if tolerating Diet  -D/c IVF when tolerating CLD  -Pain control  -Monitor drain output  -Nausea control  -Monitor drain output    A Team Surgery

## 2018-10-09 NOTE — H&P ADULT - NSHPPHYSICALEXAM_GEN_ALL_CORE
Vital Signs Last 24 Hrs  T(C): 36.9 (09 Oct 2018 00:54), Max: 37.1 (08 Oct 2018 16:37)  T(F): 98.5 (09 Oct 2018 00:54), Max: 98.7 (08 Oct 2018 16:37)  HR: 74 (09 Oct 2018 00:54) (74 - 101)  BP: 113/69 (09 Oct 2018 00:54) (110/88 - 126/81)  BP(mean): --  RR: 16 (09 Oct 2018 00:54) (16 - 20)  SpO2: 96% (09 Oct 2018 00:54) (95% - 96%)    General: well developed, well nourished, NAD  Neuro: alert and oriented, no focal deficits, moves all extremities spontaneously  HEENT: NCAT, anicteric, mucosa dry, absent dentition  Respiratory: airway patent, respirations unlabored  CVS: regular rate and rhythm  Abdomen: soft, nondistended, tender in RLQ and LLQ without rebound, mild voluntary guarding  Extremities: no edema, sensation and movement grossly intact  Skin: warm, dry, appropriate color

## 2018-10-09 NOTE — H&P ADULT - HISTORY OF PRESENT ILLNESS
HPI: 67 year old man presenting with abdominal pain for three days, associated with mild constipation, and nausea. Patient denies fevers, chills, emesis. States he started to have "a bellyache" and thought he might be constipated, he spent a lot of time straining and even performed a rectal exam/disimpaction on himself with minimal stool. He then thought the pain might be related to his straining, but when it failed to improve today, he came to the ED for further assessment. A ten point review of systems was otherwise negative.     Of note, patient was recently diagnosed with Hodgkin's lymphoma; he has not begun any treatment, but did undergo his imaging. Reports normal colonoscopy approximately 5-6 years ago. Had been trying to quit smoking and using an e-cigarette for two years, but with the diagnosis of his lymphoma, he started smoking cigarettes again.    PMHx: Affective bipolar disorder, newly diagnosed Hodgkin's Lymphoma    PSHx: S/P foot surgery    Medications (home): patient states he takes no medications, records suggest he takes lithium daily and diazepam PRN  Allergies: No Known Allergies  (Intolerances: none)  Social Hx: current every day smoker (1 pack per day, ~45 pack years), quit EtOH > 20 years ago, denies other substances; retired as  (worked night shifts), lives alone with two Labrador retrievers  Family Hx: father - lung cancer    Physical exam significant for tenderness in lower quadrants with voluntary guarding    Imaging and labs remarkable for leukocytosis and periappendiceal collection concerning for abscess

## 2018-10-09 NOTE — H&P ADULT - NSHPLABSRESULTS_GEN_ALL_CORE
16.4   23.42 )-----------( 218      ( 08 Oct 2018 19:20 )             49.4   10    137  |  99  |  34<H>  ----------------------------<  142<H>  4.2   |  20<L>  |  1.82<H>    Ca    10.6<H>      08 Oct 2018 19:20    TPro  8.9<H>  /  Alb  4.5  /  TBili  1.0  /  DBili  x   /  AST  23  /  ALT  15  /  AlkPhos  94  10-08  Urinalysis Basic - ( 08 Oct 2018 20:20 )    Color: YELLOW / Appearance: CLEAR / S.020 / pH: 6.0  Gluc: NEGATIVE / Ketone: TRACE  / Bili: NEGATIVE / Urobili: NORMAL   Blood: TRACE / Protein: 200 / Nitrite: NEGATIVE   Leuk Esterase: NEGATIVE / RBC: x / WBC x   Sq Epi: x / Non Sq Epi: x / Bacteria: x      < from: CT Abdomen and Pelvis w/ Oral Cont (10.09.18 @ 00:33) >  IMPRESSION: 6.4 x 2.9 x 2.6 cm fluid and air-containing collection adjacent to dilated, inflamed appendix with inflammatory changes of adjacent ileum, urinary bladder and sigmoid colon, likely representing perforated appendicitis with periappendiceal abscess. Perforated sigmoid diverticulitis considered less likely based on the epicenter of inflammation.  < end of copied text >

## 2018-10-09 NOTE — H&P ADULT - ASSESSMENT
67 year old male presenting with perforated appendicitis and large intraabdominal collection; afebrile and hemodynamically stable.    - admit to surgery, A team ([id number])  - maintain NPO while having pain  - IV fluid while NPO  - broad spectrum antibiotics with Zosyn in setting of perforated appendicitis and abscess  - will discuss with IR for possible drainage of abscess    Discussed with Dr. Chao  --SHAHNAZ Prakash, PGY-4

## 2018-10-09 NOTE — PROGRESS NOTE ADULT - ASSESSMENT
67y/0, recently diagnosed with lymphoma, awaiting o start chemotherapy, presenting with 3d hx of abdominal pain. CT scan c/w perf appendicitis and large abscess, not drainable by IR.    - fluid replacement  - iv antibx  - f/u labs  - consent for laparoscopic appendectomy, possible bowel resection, possible open

## 2018-10-09 NOTE — PROGRESS NOTE ADULT - SUBJECTIVE AND OBJECTIVE BOX
CAYLA since OR.  No n/v.  Not yet tolerating CLD.      Vital Signs Last 24 Hrs  T(C): 36.8 (09 Oct 2018 18:30), Max: 37.2 (09 Oct 2018 14:50)  T(F): 98.2 (09 Oct 2018 18:30), Max: 99 (09 Oct 2018 14:50)  HR: 82 (09 Oct 2018 18:30) (72 - 101)  BP: 145/90 (09 Oct 2018 18:30) (113/69 - 163/94)  BP(mean): --  RR: 17 (09 Oct 2018 18:30) (15 - 23)  SpO2: 97% (09 Oct 2018 18:30) (91% - 98%)                          14.0   16.19 )-----------( 172      ( 09 Oct 2018 09:27 )             42.5         PE: Gen: NAD  CV: RRR  Pulm: Non labored  Abd: softly distended, nt. dressings in place: hemostatic. Jose Miguel drain in place: SS output  : jacobson in place and functioning      MEDICATIONS  (STANDING):  acetaminophen  IVPB .. 1000 milliGRAM(s) IV Intermittent once  acetaminophen  IVPB .. 1000 milliGRAM(s) IV Intermittent once  enoxaparin Injectable 40 milliGRAM(s) SubCutaneous every 24 hours  influenza   Vaccine 0.5 milliLiter(s) IntraMuscular once  lactated ringers. 1000 milliLiter(s) (100 mL/Hr) IV Continuous <Continuous>  piperacillin/tazobactam IVPB. 3.375 Gram(s) IV Intermittent every 8 hours    MEDICATIONS  (PRN):  oxyCODONE    IR 5 milliGRAM(s) Oral every 4 hours PRN Moderate Pain (4 - 6)  oxyCODONE    IR 10 milliGRAM(s) Oral every 4 hours PRN Severe Pain (7 - 10)

## 2018-10-09 NOTE — PROGRESS NOTE ADULT - SUBJECTIVE AND OBJECTIVE BOX
INTERVAL HPI/OVERNIGHT EVENTS: Pt c/o abdominal pain    MEDICATIONS  (STANDING):  enoxaparin Injectable 40 milliGRAM(s) SubCutaneous every 24 hours  lactated ringers Bolus 1000 milliLiter(s) IV Bolus once  lactated ringers. 1000 milliLiter(s) (150 mL/Hr) IV Continuous <Continuous>  piperacillin/tazobactam IVPB. 3.375 Gram(s) IV Intermittent every 8 hours    MEDICATIONS  (PRN):      Vital Signs Last 24 Hrs  T(C): 37.1 (09 Oct 2018 03:43), Max: 37.1 (08 Oct 2018 16:37)  T(F): 98.8 (09 Oct 2018 03:43), Max: 98.8 (09 Oct 2018 03:43)  HR: 72 (09 Oct 2018 03:43) (72 - 101)  BP: 119/68 (09 Oct 2018 03:43) (110/88 - 126/81)  BP(mean): --  RR: 16 (09 Oct 2018 03:43) (16 - 20)  SpO2: 97% (09 Oct 2018 03:43) (95% - 97%)  I&O's Detail    Abdominal: Soft, distended, diffusely tender to palpation, positive rebound and guarding        LABS:                        16.4   23.42 )-----------( 218      ( 08 Oct 2018 19:20 )             49.4     10-    137  |  99  |  34<H>  ----------------------------<  142<H>  4.2   |  20<L>  |  1.82<H>    Ca    10.6<H>      08 Oct 2018 19:20    TPro  8.9<H>  /  Alb  4.5  /  TBili  1.0  /  DBili  x   /  AST  23  /  ALT  15  /  AlkPhos  94  10-08    PT/INR - ( 09 Oct 2018 01:30 )   PT: 12.7 SEC;   INR: 1.10          PTT - ( 09 Oct 2018 01:30 )  PTT:26.5 SEC  Urinalysis Basic - ( 08 Oct 2018 20:20 )    Color: YELLOW / Appearance: CLEAR / S.020 / pH: 6.0  Gluc: NEGATIVE / Ketone: TRACE  / Bili: NEGATIVE / Urobili: NORMAL   Blood: TRACE / Protein: 200 / Nitrite: NEGATIVE   Leuk Esterase: NEGATIVE / RBC: x / WBC x   Sq Epi: x / Non Sq Epi: x / Bacteria: x        RADIOLOGY & ADDITIONAL STUDIES:

## 2018-10-09 NOTE — ED ADULT NURSE REASSESSMENT NOTE - NS ED NURSE REASSESS COMMENT FT1
pt is resting comfortably in bed A and Ox  3in NAD, pending CT studies. pt's safety maintained, pain addressed, endorsed to JANETT Prater for continuation of care.

## 2018-10-09 NOTE — H&P ADULT - ATTENDING COMMENTS
Mr. Jaffe presents to [facility] with complaints of abdominal pain x3 days, the pain is in the RLQ and LLQ. He has not had similar pain in the past. He denies fever or chills. States he might have felt a little nauseated.    Vital Signs Last 24 Hrs  T(C): 36.7 (09 Oct 2018 10:17), Max: 37.1 (08 Oct 2018 16:37)  T(F): 98.1 (09 Oct 2018 10:17), Max: 98.8 (09 Oct 2018 03:43)  HR: 94 (09 Oct 2018 10:17) (72 - 101)  BP: 124/81 (09 Oct 2018 10:17) (110/88 - 126/81)  BP(mean): --  RR: 19 (09 Oct 2018 10:17) (16 - 20)  SpO2: 98% (09 Oct 2018 10:17) (95% - 98%)    General: Uncomfortable in bed, NAD, AOx3  Abd: Soft, distended, tender in the RLQ and LLQ,, positive rebound    CT findings of perforated appendicitis    1. Plan for OR today as no window for drainage

## 2018-10-09 NOTE — BRIEF OPERATIVE NOTE - OPERATION/FINDINGS
Laparoscopic appendectomy, drainage of intraabdominal abscess, placement of 19 Fr michelle drain in R paracolic gutter. Umbilical incision closed with interrupted sutures.

## 2018-10-09 NOTE — BRIEF OPERATIVE NOTE - PROCEDURE
<<-----Click on this checkbox to enter Procedure Appendectomy for ruptured appendix with abscess  10/09/2018    Active  [id number]

## 2018-10-10 ENCOUNTER — TRANSCRIPTION ENCOUNTER (OUTPATIENT)
Age: 68
End: 2018-10-10

## 2018-10-10 LAB
BACTERIA UR CULT: SIGNIFICANT CHANGE UP
BUN SERPL-MCNC: 34 MG/DL — HIGH (ref 7–23)
CALCIUM SERPL-MCNC: 8.9 MG/DL — SIGNIFICANT CHANGE UP (ref 8.4–10.5)
CHLORIDE SERPL-SCNC: 104 MMOL/L — SIGNIFICANT CHANGE UP (ref 98–107)
CO2 SERPL-SCNC: 21 MMOL/L — LOW (ref 22–31)
CREAT SERPL-MCNC: 1.66 MG/DL — HIGH (ref 0.5–1.3)
GLUCOSE SERPL-MCNC: 120 MG/DL — HIGH (ref 70–99)
HCT VFR BLD CALC: 36.6 % — LOW (ref 39–50)
HGB BLD-MCNC: 12.1 G/DL — LOW (ref 13–17)
MAGNESIUM SERPL-MCNC: 2.7 MG/DL — HIGH (ref 1.6–2.6)
MCHC RBC-ENTMCNC: 30.6 PG — SIGNIFICANT CHANGE UP (ref 27–34)
MCHC RBC-ENTMCNC: 33.1 % — SIGNIFICANT CHANGE UP (ref 32–36)
MCV RBC AUTO: 92.7 FL — SIGNIFICANT CHANGE UP (ref 80–100)
NRBC # FLD: 0 — SIGNIFICANT CHANGE UP
PHOSPHATE SERPL-MCNC: 3.4 MG/DL — SIGNIFICANT CHANGE UP (ref 2.5–4.5)
PLATELET # BLD AUTO: 153 K/UL — SIGNIFICANT CHANGE UP (ref 150–400)
PMV BLD: 11.4 FL — SIGNIFICANT CHANGE UP (ref 7–13)
POTASSIUM SERPL-MCNC: 4.2 MMOL/L — SIGNIFICANT CHANGE UP (ref 3.5–5.3)
POTASSIUM SERPL-SCNC: 4.2 MMOL/L — SIGNIFICANT CHANGE UP (ref 3.5–5.3)
RBC # BLD: 3.95 M/UL — LOW (ref 4.2–5.8)
RBC # FLD: 13.2 % — SIGNIFICANT CHANGE UP (ref 10.3–14.5)
SODIUM SERPL-SCNC: 138 MMOL/L — SIGNIFICANT CHANGE UP (ref 135–145)
SPECIMEN SOURCE: SIGNIFICANT CHANGE UP
WBC # BLD: 11.77 K/UL — HIGH (ref 3.8–10.5)
WBC # FLD AUTO: 11.77 K/UL — HIGH (ref 3.8–10.5)

## 2018-10-10 RX ORDER — ACETAMINOPHEN 500 MG
650 TABLET ORAL EVERY 6 HOURS
Qty: 0 | Refills: 0 | Status: DISCONTINUED | OUTPATIENT
Start: 2018-10-10 | End: 2018-10-12

## 2018-10-10 RX ORDER — FAMOTIDINE 10 MG/ML
20 INJECTION INTRAVENOUS DAILY
Qty: 0 | Refills: 0 | Status: DISCONTINUED | OUTPATIENT
Start: 2018-10-10 | End: 2018-10-12

## 2018-10-10 RX ADMIN — PIPERACILLIN AND TAZOBACTAM 25 GRAM(S): 4; .5 INJECTION, POWDER, LYOPHILIZED, FOR SOLUTION INTRAVENOUS at 06:26

## 2018-10-10 RX ADMIN — FAMOTIDINE 20 MILLIGRAM(S): 10 INJECTION INTRAVENOUS at 18:58

## 2018-10-10 RX ADMIN — ENOXAPARIN SODIUM 40 MILLIGRAM(S): 100 INJECTION SUBCUTANEOUS at 02:59

## 2018-10-10 RX ADMIN — OXYCODONE HYDROCHLORIDE 10 MILLIGRAM(S): 5 TABLET ORAL at 19:55

## 2018-10-10 RX ADMIN — OXYCODONE HYDROCHLORIDE 10 MILLIGRAM(S): 5 TABLET ORAL at 06:34

## 2018-10-10 RX ADMIN — Medication 1000 MILLIGRAM(S): at 03:29

## 2018-10-10 RX ADMIN — PIPERACILLIN AND TAZOBACTAM 25 GRAM(S): 4; .5 INJECTION, POWDER, LYOPHILIZED, FOR SOLUTION INTRAVENOUS at 21:51

## 2018-10-10 RX ADMIN — OXYCODONE HYDROCHLORIDE 10 MILLIGRAM(S): 5 TABLET ORAL at 19:03

## 2018-10-10 RX ADMIN — PIPERACILLIN AND TAZOBACTAM 25 GRAM(S): 4; .5 INJECTION, POWDER, LYOPHILIZED, FOR SOLUTION INTRAVENOUS at 14:30

## 2018-10-10 RX ADMIN — OXYCODONE HYDROCHLORIDE 10 MILLIGRAM(S): 5 TABLET ORAL at 07:20

## 2018-10-10 RX ADMIN — Medication 400 MILLIGRAM(S): at 02:59

## 2018-10-10 NOTE — DISCHARGE NOTE ADULT - PLAN OF CARE
Laparoscopic appendectomy, drainage of intraabdominal abscess WOUND CARE:  Please keep incisions clean and dry. Please do not Scrub or rub incisions. Do not use lotion or powder on incisions.   BATHING: You may shower. You may use warm soapy water in the shower and rinse, pat dry.  ACTIVITY: No heavy lifting or straining. Otherwise, you may return to your usual level of physical activity. If you are taking narcotic pain medication DO NOT drive a car, operate machinery or make important decisions.  DIET: Return to your usual diet.  NOTIFY YOUR SURGEON IF: You have any bleeding that does not stop, any pus draining from your wound(s), increased pain at surgical site, any fever (over 100.4 F) persistent nausea/vomiting, or if your pain is not controlled on your discharge pain medications.  Please follow up with your primary care physician in 1-2 weeks regarding your hospitalization.  Please follow up with your surgeon, Dr. Chao. Please call office to make an appointment WOUND CARE:  Please keep incisions clean and dry. Please do not Scrub or rub incisions. Do not use lotion or powder on incisions.   BATHING: You may shower. You may use warm soapy water in the shower and rinse, pat dry.  ACTIVITY: No heavy lifting or straining. Otherwise, you may return to your usual level of physical activity. If you are taking narcotic pain medication DO NOT drive a car, operate machinery or make important decisions.  DIET: Return to your usual diet.  NOTIFY YOUR SURGEON IF: You have any bleeding that does not stop, any pus draining from your wound(s), increased pain at surgical site, any fever (over 100.4 F) persistent nausea/vomiting, or if your pain is not controlled on your discharge pain medications.  Please follow up with your primary care physician in 1-2 weeks regarding your hospitalization.  Please follow up with your surgeon, Dr. Chao. Please call office to make an appointment.

## 2018-10-10 NOTE — DISCHARGE NOTE ADULT - PATIENT PORTAL LINK FT
You can access the [facility] Patient Portal, offered by [facility], by registering with the following website: [email or web address]

## 2018-10-10 NOTE — DISCHARGE NOTE ADULT - CARE PLAN
Principal Discharge DX:	Perforated appendicitis  Goal:	Laparoscopic appendectomy, drainage of intraabdominal abscess  Assessment and plan of treatment:	WOUND CARE:  Please keep incisions clean and dry. Please do not Scrub or rub incisions. Do not use lotion or powder on incisions.   BATHING: You may shower. You may use warm soapy water in the shower and rinse, pat dry.  ACTIVITY: No heavy lifting or straining. Otherwise, you may return to your usual level of physical activity. If you are taking narcotic pain medication DO NOT drive a car, operate machinery or make important decisions.  DIET: Return to your usual diet.  NOTIFY YOUR SURGEON IF: You have any bleeding that does not stop, any pus draining from your wound(s), increased pain at surgical site, any fever (over 100.4 F) persistent nausea/vomiting, or if your pain is not controlled on your discharge pain medications.  Please follow up with your primary care physician in 1-2 weeks regarding your hospitalization.  Please follow up with your surgeon, Dr. Chao. Please call office to make an appointment Principal Discharge DX:	Perforated appendicitis  Goal:	Laparoscopic appendectomy, drainage of intraabdominal abscess  Assessment and plan of treatment:	WOUND CARE:  Please keep incisions clean and dry. Please do not Scrub or rub incisions. Do not use lotion or powder on incisions.   BATHING: You may shower. You may use warm soapy water in the shower and rinse, pat dry.  ACTIVITY: No heavy lifting or straining. Otherwise, you may return to your usual level of physical activity. If you are taking narcotic pain medication DO NOT drive a car, operate machinery or make important decisions.  DIET: Return to your usual diet.  NOTIFY YOUR SURGEON IF: You have any bleeding that does not stop, any pus draining from your wound(s), increased pain at surgical site, any fever (over 100.4 F) persistent nausea/vomiting, or if your pain is not controlled on your discharge pain medications.  Please follow up with your primary care physician in 1-2 weeks regarding your hospitalization.  Please follow up with your surgeon, Dr. Chao. Please call office to make an appointment.

## 2018-10-10 NOTE — DISCHARGE NOTE ADULT - MEDICATION SUMMARY - MEDICATIONS TO TAKE
I will START or STAY ON the medications listed below when I get home from the hospital:    acetaminophen 325 mg oral tablet  -- 2 tab(s) by mouth every 6 hours, As needed, Mild Pain (1 - 3)  -- Indication: For Pain    oxyCODONE 5 mg oral tablet  -- 1 tab(s) by mouth every 4 hours, As needed, Moderate Pain (4 - 6) MDD:6  -- Indication: For Pain    diazePAM 10 mg oral tablet  -- Four  times daily.  -- Indication: For home med    lithium carbonate  -- 300 mg 4x daily.  -- Indication: For home med    Augmentin 875 mg-125 mg oral tablet  -- 1 tab(s) by mouth every 12 hours   -- Finish all this medication unless otherwise directed by prescriber.  Take with food or milk.    -- Indication: For Antibiotic I will START or STAY ON the medications listed below when I get home from the hospital:    acetaminophen 325 mg oral tablet  -- 2 tab(s) by mouth every 6 hours, As needed, Mild Pain (1 - 3)  -- Indication: For Pain    oxyCODONE 5 mg oral tablet  -- 1 tab(s) by mouth every 4 hours, As needed, Moderate Pain (4 - 6) MDD:6  -- Indication: For Pain    diazePAM 10 mg oral tablet  -- Four  times daily.  -- Indication: For home med    lithium carbonate  -- 300 mg 4x daily.  -- Indication: For home med    docusate sodium 100 mg oral capsule  -- 1 cap(s) by mouth 3 times a day  -- Indication: For constipation    senna oral tablet  -- 2 tab(s) by mouth once a day (at bedtime)  -- Indication: For constipation    Augmentin 875 mg-125 mg oral tablet  -- 1 tab(s) by mouth every 12 hours   -- Finish all this medication unless otherwise directed by prescriber.  Take with food or milk.    -- Indication: For Antibiotic

## 2018-10-10 NOTE — DISCHARGE NOTE ADULT - CONDITIONS AT DISCHARGE
Alert & oriented. Out of bed ambulating. Tolerating diet without nausea or vomiting. Voiding without difficulty. Vitals stable, afebrile. Understands all discharge instruction & will follow up with the MD. Patient has BEATRIZ drain to abdomen, education and supplies provided.

## 2018-10-10 NOTE — PROGRESS NOTE ADULT - SUBJECTIVE AND OBJECTIVE BOX
SURGERY A-TEAM PROGRESS NOTE    S: patient seen at bedside.  Advanced to regular diet last night, IVL.  OOB.  Denies n/v.        Vital Signs Last 24 Hrs  T(C): 36.7 (10 Oct 2018 06:00), Max: 37.2 (09 Oct 2018 14:50)  T(F): 98.1 (10 Oct 2018 06:00), Max: 99 (09 Oct 2018 14:50)  HR: 65 (10 Oct 2018 06:00) (65 - 99)  BP: 112/71 (10 Oct 2018 06:00) (107/74 - 163/94)  BP(mean): --  RR: 18 (10 Oct 2018 06:00) (15 - 23)  SpO2: 96% (10 Oct 2018 06:00) (91% - 98%)    10-09-18 @ 07:01  -  10-10-18 @ 07:00  --------------------------------------------------------  IN: 2020 mL / OUT: 2935 mL / NET: -915 mL      MEDICATIONS  (STANDING):  acetaminophen  IVPB .. 1000 milliGRAM(s) IV Intermittent once  enoxaparin Injectable 40 milliGRAM(s) SubCutaneous every 24 hours  influenza   Vaccine 0.5 milliLiter(s) IntraMuscular once  piperacillin/tazobactam IVPB. 3.375 Gram(s) IV Intermittent every 8 hours    MEDICATIONS  (PRN):  oxyCODONE    IR 5 milliGRAM(s) Oral every 4 hours PRN Moderate Pain (4 - 6)  oxyCODONE    IR 10 milliGRAM(s) Oral every 4 hours PRN Severe Pain (7 - 10)    CBC (10-09 @ 09:27)                              14.0                           16.19<H>  )----------------(  172        87.2<H>% Neutrophils, 5.9<L>% Lymphocytes, ANC: 14.12<H>                              42.5                CBC (10-08 @ 19:20)                              16.4                           23.42<H>  )----------------(  218        87.4<H>% Neutrophils, 5.2<L>% Lymphocytes, ANC: 20.49<H>                              49.4                  BMP (10-09 @ 09:27)             139     |  104     |  35<H> 		Ca++ --      Ca 10.0               ---------------------------------( 111<H>		Mg 2.9<H>             4.5     |  23      |  1.61<H>			Ph 2.4<L>  BMP (10-08 @ 19:20)             137     |  99      |  34<H> 		Ca++ --      Ca 10.6<H>             ---------------------------------( 142<H>		Mg --                 4.2     |  20<L>   |  1.82<H>			Ph --        LFTs (10-09 @ 09:27)      TPro 7.5 / Alb 3.7 / TBili 0.8 / DBili -- / AST 15 / ALT 11 / AlkPhos 73  LFTs (10-08 @ 19:20)      TPro 8.9<H> / Alb 4.5 / TBili 1.0 / DBili -- / AST 23 / ALT 15 / AlkPhos 94    Coags (10-09 @ 01:30)  aPTT 26.5<L> / INR 1.10 / PT 12.7    ABG (10-09 @ 01:30)      /  /  /  /  / %     Lactate:   1.6    VBG (10-09 @ 01:30)     7.41 / 41 / 35 / 25 / 1.1 / 63.7%    -> ABSCESS MISC Culture (10-09 @ 14:59)       NOS^No Organisms Seen  WBC^White Blood Cells  QNTY CELLS IN GRAM STAIN: RARE (1+)    NG  NG    -> BLOOD PERIPHERAL Culture (10-08 @ 21:16)     NG    NG  NG      PE: Gen: NAD  CV: RRR  Pulm: Non labored  Abd: softly distended, nt. dressings in place: hemostatic. Jose Miguel drain in place: SS output  : jacobson in place and functioning

## 2018-10-10 NOTE — DISCHARGE NOTE ADULT - CARE PROVIDER_API CALL
Lamont Chao (MD), Surgery  [address] [facility] [facility]  [address]  [place] [address]  Phone: [phone]  Fax: [phone]    Kojo Alvarado), ColonRectal Surgery; Surgery  [address] [facility] [address] [facility]  [address]  [place] [address]  Phone: [phone]  Fax: [phone]

## 2018-10-10 NOTE — DISCHARGE NOTE ADULT - INSTRUCTIONS
Regular Watch for signs of infection; redness, swelling, fever, chills or heat, report such symptoms to the MD. No driving while taking pain medication, it causes drowsiness & constipation. Drink 6-8 glasses of fluids daily to promote hydration. No heavy lifting, pulling or pushing heavy objects. Follow up with the MD. You are being discharged with BEATRIZ drain. Please empty drain at least TWICE per day and record drainage amount on sheet. Please complete antibiotics as prescribed.

## 2018-10-10 NOTE — DISCHARGE NOTE ADULT - ADDITIONAL INSTRUCTIONS
Please follow up with your surgeon, Dr. Chao. Please call office [phone] to make an appointment Please follow up with your surgeon, Dr. Chao. Please call office [phone] to make an appointment on 10/17/18.

## 2018-10-10 NOTE — PROGRESS NOTE ADULT - ASSESSMENT
A/P: 66 yo M s/p lap appendectomy for perforated appendicitis with abscess  -Advanced to LRD, tolerating; denies n/v  -C/W Zosyn  -IVL fluids  -Pain control  -Monitor drain output  -Nausea control, PRN Zofran  -Trial of Void today      A Team Surgery  [id number]

## 2018-10-10 NOTE — DISCHARGE NOTE ADULT - HOSPITAL COURSE
67 year old man presenting with abdominal pain for three days, associated with mild constipation, and nausea. Patient denies fevers, chills, emesis. States he started to have "a bellyache" and thought he might be constipated, he spent a lot of time straining and even performed a rectal exam/disimpaction on himself with minimal stool. He then thought the pain might be related to his straining, but when it failed to improve today, he came to the ED for further assessment.     Of note, patient was recently diagnosed with Hodgkin's lymphoma; he has not begun any treatment, but did undergo his imaging. Reports normal colonoscopy approximately 5-6 years ago. Had been trying to quit smoking and using an e-cigarette for two years, but with the diagnosis of his lymphoma, he started smoking cigarettes again.    Pt had a CT scan which showed 6.4 x 2.9 x 2.6 cm fluid and air-containing collection adjacent to dilated, inflamed appendix with inflammatory changes of adjacent ileum, urinary bladder and sigmoid colon, likely representing perforated appendicitis with periappendiceal abscess.     IR was consulted for possible drainage however they were unable to.    On 10/9 Pt underwent Laparoscopic appendectomy, drainage of intraabdominal abscess and placement of a 19F michelle drain in right paracolic gutter. During hospital course patients diet was slowly advanced as tolerated.  His jacobson was removed on 10/10 and he passed his TOV. At this time, pt is tolerating a regular diet, ambulating and voiding.  Pt has been deemed stable for discharge at this time. 67 year old man presenting with abdominal pain for three days, associated with mild constipation, and nausea. Patient denies fevers, chills, emesis. States he started to have "a bellyache" and thought he might be constipated, he spent a lot of time straining and even performed a rectal exam/disimpaction on himself with minimal stool. He then thought the pain might be related to his straining, but when it failed to improve today, he came to the ED for further assessment.     Of note, patient was recently diagnosed with Hodgkin's lymphoma; he has not begun any treatment, but did undergo his imaging. Reports normal colonoscopy approximately 5-6 years ago. Had been trying to quit smoking and using an e-cigarette for two years, but with the diagnosis of his lymphoma, he started smoking cigarettes again.    Pt had a CT scan which showed 6.4 x 2.9 x 2.6 cm fluid and air-containing collection adjacent to dilated, inflamed appendix with inflammatory changes of adjacent ileum, urinary bladder and sigmoid colon, likely representing perforated appendicitis with periappendiceal abscess.     IR was consulted for possible drainage however they were unable to.    On 10/9 Pt underwent Laparoscopic appendectomy, drainage of intraabdominal abscess and placement of a 19F michelle drain in right paracolic gutter. During hospital course patients diet was slowly advanced as tolerated.  His jacobson was removed on 10/10 and he passed his TOV. At this time, pt is tolerating a regular diet, ambulating and voiding.  Pt has been deemed stable for discharge at this time.    istop #: [id number]

## 2018-10-11 LAB
BUN SERPL-MCNC: 36 MG/DL — HIGH (ref 7–23)
CALCIUM SERPL-MCNC: 9.6 MG/DL — SIGNIFICANT CHANGE UP (ref 8.4–10.5)
CHLORIDE SERPL-SCNC: 104 MMOL/L — SIGNIFICANT CHANGE UP (ref 98–107)
CO2 SERPL-SCNC: 21 MMOL/L — LOW (ref 22–31)
CREAT FLD-MCNC: 1.56 MG/DL — SIGNIFICANT CHANGE UP
CREAT SERPL-MCNC: 1.6 MG/DL — HIGH (ref 0.5–1.3)
GLUCOSE SERPL-MCNC: 119 MG/DL — HIGH (ref 70–99)
GRAM STN WND: SIGNIFICANT CHANGE UP
HCT VFR BLD CALC: 41.8 % — SIGNIFICANT CHANGE UP (ref 39–50)
HGB BLD-MCNC: 14 G/DL — SIGNIFICANT CHANGE UP (ref 13–17)
MAGNESIUM SERPL-MCNC: 2.5 MG/DL — SIGNIFICANT CHANGE UP (ref 1.6–2.6)
MCHC RBC-ENTMCNC: 31 PG — SIGNIFICANT CHANGE UP (ref 27–34)
MCHC RBC-ENTMCNC: 33.5 % — SIGNIFICANT CHANGE UP (ref 32–36)
MCV RBC AUTO: 92.7 FL — SIGNIFICANT CHANGE UP (ref 80–100)
METHOD TYPE: SIGNIFICANT CHANGE UP
NRBC # FLD: 0 — SIGNIFICANT CHANGE UP
ORGANISM # SPEC MICROSCOPIC CNT: SIGNIFICANT CHANGE UP
PHOSPHATE SERPL-MCNC: 3.2 MG/DL — SIGNIFICANT CHANGE UP (ref 2.5–4.5)
PLATELET # BLD AUTO: 198 K/UL — SIGNIFICANT CHANGE UP (ref 150–400)
PMV BLD: 11.1 FL — SIGNIFICANT CHANGE UP (ref 7–13)
POTASSIUM SERPL-MCNC: 3.9 MMOL/L — SIGNIFICANT CHANGE UP (ref 3.5–5.3)
POTASSIUM SERPL-SCNC: 3.9 MMOL/L — SIGNIFICANT CHANGE UP (ref 3.5–5.3)
RBC # BLD: 4.51 M/UL — SIGNIFICANT CHANGE UP (ref 4.2–5.8)
RBC # FLD: 13.2 % — SIGNIFICANT CHANGE UP (ref 10.3–14.5)
SODIUM SERPL-SCNC: 141 MMOL/L — SIGNIFICANT CHANGE UP (ref 135–145)
WBC # BLD: 11.47 K/UL — HIGH (ref 3.8–10.5)
WBC # FLD AUTO: 11.47 K/UL — HIGH (ref 3.8–10.5)

## 2018-10-11 RX ADMIN — OXYCODONE HYDROCHLORIDE 10 MILLIGRAM(S): 5 TABLET ORAL at 21:00

## 2018-10-11 RX ADMIN — PIPERACILLIN AND TAZOBACTAM 25 GRAM(S): 4; .5 INJECTION, POWDER, LYOPHILIZED, FOR SOLUTION INTRAVENOUS at 06:11

## 2018-10-11 RX ADMIN — OXYCODONE HYDROCHLORIDE 10 MILLIGRAM(S): 5 TABLET ORAL at 14:00

## 2018-10-11 RX ADMIN — ENOXAPARIN SODIUM 40 MILLIGRAM(S): 100 INJECTION SUBCUTANEOUS at 02:10

## 2018-10-11 RX ADMIN — OXYCODONE HYDROCHLORIDE 10 MILLIGRAM(S): 5 TABLET ORAL at 01:47

## 2018-10-11 RX ADMIN — Medication 650 MILLIGRAM(S): at 23:23

## 2018-10-11 RX ADMIN — OXYCODONE HYDROCHLORIDE 10 MILLIGRAM(S): 5 TABLET ORAL at 13:17

## 2018-10-11 RX ADMIN — OXYCODONE HYDROCHLORIDE 10 MILLIGRAM(S): 5 TABLET ORAL at 21:30

## 2018-10-11 RX ADMIN — OXYCODONE HYDROCHLORIDE 10 MILLIGRAM(S): 5 TABLET ORAL at 00:52

## 2018-10-11 RX ADMIN — FAMOTIDINE 20 MILLIGRAM(S): 10 INJECTION INTRAVENOUS at 13:17

## 2018-10-11 RX ADMIN — Medication 650 MILLIGRAM(S): at 23:53

## 2018-10-11 RX ADMIN — PIPERACILLIN AND TAZOBACTAM 25 GRAM(S): 4; .5 INJECTION, POWDER, LYOPHILIZED, FOR SOLUTION INTRAVENOUS at 13:17

## 2018-10-11 RX ADMIN — PIPERACILLIN AND TAZOBACTAM 25 GRAM(S): 4; .5 INJECTION, POWDER, LYOPHILIZED, FOR SOLUTION INTRAVENOUS at 21:00

## 2018-10-11 NOTE — PROGRESS NOTE ADULT - SUBJECTIVE AND OBJECTIVE BOX
SURGERY A-TEAM PROGRESS NOTE    S: patient seen at bedside.  -  doing well on regular diet, no n/v  -  fairly sore in the subcostal RUQ he says  -  flatus but no bm      OBJECTIVE:    Vital Signs Last 24 Hrs  T(C): 37 (11 Oct 2018 06:00), Max: 37 (10 Oct 2018 21:06)  T(F): 98.6 (11 Oct 2018 06:00), Max: 98.6 (10 Oct 2018 21:06)  HR: 81 (11 Oct 2018 06:00) (68 - 100)  BP: 122/89 (11 Oct 2018 06:00) (108/70 - 138/95)  BP(mean): --  RR: 18 (11 Oct 2018 06:00) (16 - 18)  SpO2: 97% (11 Oct 2018 06:00) (95% - 97%)    I&O's Detail    10 Oct 2018 07:01  -  11 Oct 2018 07:00  --------------------------------------------------------  IN:    IV PiggyBack: 200 mL    Oral Fluid: 380 mL  Total IN: 580 mL    OUT:    Bulb: 512 mL    Indwelling Catheter - Urethral: 900 mL    Voided: 1801 mL  Total OUT: 3213 mL    Total NET: -2633 mL      LABS:                        14.0   11.47 )-----------( 198      ( 11 Oct 2018 07:20 )             41.8     10-11    141  |  104  |  36<H>  ----------------------------<  119<H>  3.9   |  21<L>  |  1.60<H>    Ca    9.6      11 Oct 2018 07:20  Phos  3.2     10-11  Mg     2.5     10-11      EXAM:  Gen: NAD  CV: RRR  Pulm: Non labored  Abd: soft, mild distension, nontender. dressings in place, c/d/i. Jose Miguel drain in place: SS output  : jacobson in place and functioning

## 2018-10-11 NOTE — PROGRESS NOTE ADULT - ASSESSMENT
A/P: 68 yo M s/p lap appendectomy for perforated appendicitis with abscess (10/9)    -c/w reg diet  -C/W Zosyn  -Pain control  -Monitor drain output  -dispo when wbc 11, pain adequately improved, tolerating po abx    A Team Surgery  [id number] A/P: 68 yo M s/p lap appendectomy for perforated appendicitis with abscess (10/9)    -c/w reg diet  -C/W Zosyn  -Pain control  -Monitor drain output  -dispo when wbc wnl, pain improved, tolerating po abx    A Team Surgery  [id number]

## 2018-10-12 VITALS
SYSTOLIC BLOOD PRESSURE: 110 MMHG | OXYGEN SATURATION: 99 % | DIASTOLIC BLOOD PRESSURE: 71 MMHG | HEART RATE: 82 BPM | RESPIRATION RATE: 18 BRPM | TEMPERATURE: 98 F

## 2018-10-12 LAB
BUN SERPL-MCNC: 34 MG/DL — HIGH (ref 7–23)
CALCIUM SERPL-MCNC: 9.9 MG/DL — SIGNIFICANT CHANGE UP (ref 8.4–10.5)
CHLORIDE SERPL-SCNC: 103 MMOL/L — SIGNIFICANT CHANGE UP (ref 98–107)
CO2 SERPL-SCNC: 22 MMOL/L — SIGNIFICANT CHANGE UP (ref 22–31)
CREAT SERPL-MCNC: 1.77 MG/DL — HIGH (ref 0.5–1.3)
GLUCOSE SERPL-MCNC: 120 MG/DL — HIGH (ref 70–99)
HCT VFR BLD CALC: 44.9 % — SIGNIFICANT CHANGE UP (ref 39–50)
HGB BLD-MCNC: 14.6 G/DL — SIGNIFICANT CHANGE UP (ref 13–17)
MAGNESIUM SERPL-MCNC: 2.5 MG/DL — SIGNIFICANT CHANGE UP (ref 1.6–2.6)
MCHC RBC-ENTMCNC: 30.9 PG — SIGNIFICANT CHANGE UP (ref 27–34)
MCHC RBC-ENTMCNC: 32.5 % — SIGNIFICANT CHANGE UP (ref 32–36)
MCV RBC AUTO: 95.1 FL — SIGNIFICANT CHANGE UP (ref 80–100)
NRBC # FLD: 0 — SIGNIFICANT CHANGE UP
ORGANISM # SPEC MICROSCOPIC CNT: SIGNIFICANT CHANGE UP
ORGANISM # SPEC MICROSCOPIC CNT: SIGNIFICANT CHANGE UP
PHOSPHATE SERPL-MCNC: 3.8 MG/DL — SIGNIFICANT CHANGE UP (ref 2.5–4.5)
PLATELET # BLD AUTO: 213 K/UL — SIGNIFICANT CHANGE UP (ref 150–400)
PMV BLD: 11 FL — SIGNIFICANT CHANGE UP (ref 7–13)
POTASSIUM SERPL-MCNC: 4.6 MMOL/L — SIGNIFICANT CHANGE UP (ref 3.5–5.3)
POTASSIUM SERPL-SCNC: 4.6 MMOL/L — SIGNIFICANT CHANGE UP (ref 3.5–5.3)
RBC # BLD: 4.72 M/UL — SIGNIFICANT CHANGE UP (ref 4.2–5.8)
RBC # FLD: 12.8 % — SIGNIFICANT CHANGE UP (ref 10.3–14.5)
SODIUM SERPL-SCNC: 142 MMOL/L — SIGNIFICANT CHANGE UP (ref 135–145)
WBC # BLD: 11.88 K/UL — HIGH (ref 3.8–10.5)
WBC # FLD AUTO: 11.88 K/UL — HIGH (ref 3.8–10.5)

## 2018-10-12 RX ORDER — DOCUSATE SODIUM 100 MG
100 CAPSULE ORAL THREE TIMES A DAY
Qty: 0 | Refills: 0 | Status: DISCONTINUED | OUTPATIENT
Start: 2018-10-12 | End: 2018-10-12

## 2018-10-12 RX ORDER — SENNA PLUS 8.6 MG/1
2 TABLET ORAL
Qty: 0 | Refills: 0 | COMMUNITY
Start: 2018-10-12

## 2018-10-12 RX ORDER — ACETAMINOPHEN 500 MG
2 TABLET ORAL
Qty: 0 | Refills: 0 | COMMUNITY
Start: 2018-10-12

## 2018-10-12 RX ORDER — OXYCODONE HYDROCHLORIDE 5 MG/1
1 TABLET ORAL
Qty: 18 | Refills: 0 | OUTPATIENT
Start: 2018-10-12 | End: 2018-10-14

## 2018-10-12 RX ORDER — SENNA PLUS 8.6 MG/1
2 TABLET ORAL AT BEDTIME
Qty: 0 | Refills: 0 | Status: DISCONTINUED | OUTPATIENT
Start: 2018-10-12 | End: 2018-10-12

## 2018-10-12 RX ORDER — DOCUSATE SODIUM 100 MG
1 CAPSULE ORAL
Qty: 0 | Refills: 0 | COMMUNITY
Start: 2018-10-12

## 2018-10-12 RX ADMIN — Medication 650 MILLIGRAM(S): at 11:19

## 2018-10-12 RX ADMIN — Medication 100 MILLIGRAM(S): at 11:20

## 2018-10-12 RX ADMIN — ENOXAPARIN SODIUM 40 MILLIGRAM(S): 100 INJECTION SUBCUTANEOUS at 01:39

## 2018-10-12 RX ADMIN — Medication 650 MILLIGRAM(S): at 13:49

## 2018-10-12 RX ADMIN — FAMOTIDINE 20 MILLIGRAM(S): 10 INJECTION INTRAVENOUS at 07:39

## 2018-10-12 RX ADMIN — PIPERACILLIN AND TAZOBACTAM 25 GRAM(S): 4; .5 INJECTION, POWDER, LYOPHILIZED, FOR SOLUTION INTRAVENOUS at 05:27

## 2018-10-12 NOTE — PROGRESS NOTE ADULT - ASSESSMENT
A/P: 68 yo M s/p lap appendectomy for perforated appendicitis with abscess (10/9)    -c/w reg diet  -augmentin po x 7 days total  -Pain control  -Monitor drain output  -home today if home RN for abx set up    A Team Surgery  [id number]

## 2018-10-12 NOTE — PROGRESS NOTE ADULT - ATTENDING COMMENTS
Mr. Jaffe is comfortable in bed, he denies any nausea or vomiting. His pain is controlled with medication.    Vital Signs Last 24 Hrs  T(C): 36.7 (10 Oct 2018 06:00), Max: 37.2 (09 Oct 2018 14:50)  T(F): 98.1 (10 Oct 2018 06:00), Max: 99 (09 Oct 2018 14:50)  HR: 65 (10 Oct 2018 06:00) (65 - 99)  BP: 112/71 (10 Oct 2018 06:00) (107/74 - 163/94)  BP(mean): --  RR: 18 (10 Oct 2018 06:00) (15 - 23)  SpO2: 96% (10 Oct 2018 06:00) (91% - 98%)    General: comfortable in bed, NAD, AOx3  Abd: Soft, minimal distension, tender over incision and drain site. Drain is serous. Indwelling catheter with clear urine.    1. Remove indwelling catheter  2. Continue Zosyn, await sensitivities  3. Continue drain  4. Advance diet as tolerated  5. Out of bed and ambulate.
Mr. Jaffe is comfortable, he denies any nausea or vomiting. He was able to tolerate PO intake.    Vital Signs Last 24 Hrs  T(C): 36.8 (12 Oct 2018 05:27), Max: 36.9 (11 Oct 2018 10:10)  T(F): 98.2 (12 Oct 2018 05:27), Max: 98.5 (11 Oct 2018 10:10)  HR: 75 (12 Oct 2018 05:27) (75 - 99)  BP: 125/82 (12 Oct 2018 05:27) (125/82 - 150/94)  BP(mean): --  RR: 16 (12 Oct 2018 05:27) (16 - 18)  SpO2: 98% (12 Oct 2018 05:27) (96% - 99%)    General: comfortable in bed, NAD, AOx3  Abd: Soft, mild distension, appropriately tender, BEATRIZ serous with >200mL of output    67 year old man s/p laparoscopic appendectomy for perforated appendicitis  1. Plan for discharge today with PO Augmentin.   2. Follow up in office next week for removal of drain
Mr. Jaffe is comfortable in bed. He denies fever or chills. His abdominal pain is well controlled. He states he is slightly nauseated, but has not vomited.    Vital Signs Last 24 Hrs  T(C): 36.9 (11 Oct 2018 10:10), Max: 37 (10 Oct 2018 21:06)  T(F): 98.5 (11 Oct 2018 10:10), Max: 98.6 (10 Oct 2018 21:06)  HR: 93 (11 Oct 2018 10:10) (68 - 100)  BP: 128/91 (11 Oct 2018 10:10) (108/70 - 138/95)  BP(mean): --  RR: 17 (11 Oct 2018 10:10) (16 - 18)  SpO2: 97% (11 Oct 2018 10:10) (95% - 97%)    General: comfortable in bed, NAD, AOx3  Abd: Soft, not tender, appropriately tender, BEATRIZ serous    67 year old man s/p laparoscopic appendectomy for perforated appendicitis  1. Continue antibiotics  2. Regular diet  3. Out of bed and ambulate

## 2018-10-12 NOTE — PROGRESS NOTE ADULT - SUBJECTIVE AND OBJECTIVE BOX
SURGERY A-TEAM PROGRESS NOTE    S: patient seen at bedside.  -  doing well on regular diet, no n/v  -  pain improved  -  flatus and bm  -  still high BEATRIZ drain output    OBJECTIVE:    Vital Signs Last 24 Hrs  T(C): 36.8 (12 Oct 2018 05:27), Max: 36.9 (11 Oct 2018 10:10)  T(F): 98.2 (12 Oct 2018 05:27), Max: 98.5 (11 Oct 2018 10:10)  HR: 75 (12 Oct 2018 05:27) (75 - 99)  BP: 125/82 (12 Oct 2018 05:27) (125/82 - 150/94)  BP(mean): --  RR: 16 (12 Oct 2018 05:27) (16 - 18)  SpO2: 98% (12 Oct 2018 05:27) (96% - 99%)    I&O's Detail    11 Oct 2018 07:01  -  12 Oct 2018 07:00  --------------------------------------------------------  IN:    IV PiggyBack: 200 mL    Oral Fluid: 744 mL  Total IN: 944 mL    OUT:    Bulb: 229.5 mL    Voided: 2875 mL  Total OUT: 3104.5 mL    Total NET: -2160.5 mL      LABS:                        14.0   11.47 )-----------( 198      ( 11 Oct 2018 07:20 )             41.8     10-11    141  |  104  |  36<H>  ----------------------------<  119<H>  3.9   |  21<L>  |  1.60<H>    Ca    9.6      11 Oct 2018 07:20  Phos  3.2     10-11  Mg     2.5     10-11      EXAM:  Gen: NAD  CV: RRR  Pulm: Non labored  Abd: soft, mild distension, nontender. dressings in place, c/d/i. Jose Miguel drain in place: SS output  : jacobson in place and functioning

## 2018-10-12 NOTE — PROGRESS NOTE ADULT - REASON FOR ADMISSION
abdominal pain, perforated appendicitis

## 2018-10-13 LAB
-  AMIKACIN: SIGNIFICANT CHANGE UP
-  AMPICILLIN/SULBACTAM: SIGNIFICANT CHANGE UP
-  AMPICILLIN/SULBACTAM: SIGNIFICANT CHANGE UP
-  AMPICILLIN: SIGNIFICANT CHANGE UP
-  AMPICILLIN: SIGNIFICANT CHANGE UP
-  AZTREONAM: SIGNIFICANT CHANGE UP
-  CEFAZOLIN: SIGNIFICANT CHANGE UP
-  CEFAZOLIN: SIGNIFICANT CHANGE UP
-  CEFEPIME: SIGNIFICANT CHANGE UP
-  CEFOXITIN: SIGNIFICANT CHANGE UP
-  CEFOXITIN: SIGNIFICANT CHANGE UP
-  CEFTAZIDIME: SIGNIFICANT CHANGE UP
-  CEFTRIAXONE: SIGNIFICANT CHANGE UP
-  CIPROFLOXACIN: SIGNIFICANT CHANGE UP
-  CLINDAMYCIN: SIGNIFICANT CHANGE UP
-  ERTAPENEM: SIGNIFICANT CHANGE UP
-  ERTAPENEM: SIGNIFICANT CHANGE UP
-  ERYTHROMYCIN: SIGNIFICANT CHANGE UP
-  GENTAMICIN: SIGNIFICANT CHANGE UP
-  IMIPENEM: SIGNIFICANT CHANGE UP
-  LEVOFLOXACIN: SIGNIFICANT CHANGE UP
-  MEROPENEM: SIGNIFICANT CHANGE UP
-  PENICILLIN G: SIGNIFICANT CHANGE UP
-  PIPERACILLIN/TAZOBACTAM: SIGNIFICANT CHANGE UP
-  TIGECYCLINE: SIGNIFICANT CHANGE UP
-  TIGECYCLINE: SIGNIFICANT CHANGE UP
-  TOBRAMYCIN: SIGNIFICANT CHANGE UP
-  TRIMETHOPRIM/SULFAMETHOXAZOLE: SIGNIFICANT CHANGE UP
-  TRIMETHOPRIM/SULFAMETHOXAZOLE: SIGNIFICANT CHANGE UP
-  VANCOMYCIN: SIGNIFICANT CHANGE UP
BACTERIA BLD CULT: SIGNIFICANT CHANGE UP
BACTERIA BLD CULT: SIGNIFICANT CHANGE UP
CULTURE - SURGICAL SITE: SIGNIFICANT CHANGE UP
METHOD TYPE: SIGNIFICANT CHANGE UP
ORGANISM # SPEC MICROSCOPIC CNT: SIGNIFICANT CHANGE UP
ORGANISM # SPEC MICROSCOPIC CNT: SIGNIFICANT CHANGE UP

## 2018-10-16 ENCOUNTER — APPOINTMENT (OUTPATIENT)
Dept: INFUSION THERAPY | Facility: HOSPITAL | Age: 68
End: 2018-10-16

## 2018-10-16 ENCOUNTER — APPOINTMENT (OUTPATIENT)
Dept: HEMATOLOGY ONCOLOGY | Facility: CLINIC | Age: 68
End: 2018-10-16

## 2018-10-22 ENCOUNTER — APPOINTMENT (OUTPATIENT)
Dept: PULMONOLOGY | Facility: CLINIC | Age: 68
End: 2018-10-22

## 2018-10-23 ENCOUNTER — APPOINTMENT (OUTPATIENT)
Dept: INFUSION THERAPY | Facility: HOSPITAL | Age: 68
End: 2018-10-23

## 2018-10-23 ENCOUNTER — MEDICATION RENEWAL (OUTPATIENT)
Age: 68
End: 2018-10-23

## 2018-10-23 DIAGNOSIS — M10.9 GOUT, UNSPECIFIED: ICD-10-CM

## 2018-10-23 RX ORDER — COLCHICINE 0.6 MG/1
0.6 TABLET ORAL
Qty: 60 | Refills: 3 | Status: ACTIVE | COMMUNITY
Start: 2018-10-23 | End: 1900-01-01

## 2018-11-05 ENCOUNTER — APPOINTMENT (OUTPATIENT)
Dept: INTERNAL MEDICINE | Facility: CLINIC | Age: 68
End: 2018-11-05
Payer: MEDICARE

## 2018-11-05 VITALS
BODY MASS INDEX: 27.2 KG/M2 | WEIGHT: 190 LBS | HEART RATE: 72 BPM | SYSTOLIC BLOOD PRESSURE: 118 MMHG | DIASTOLIC BLOOD PRESSURE: 78 MMHG | HEIGHT: 70 IN

## 2018-11-05 DIAGNOSIS — C81.90 HODGKIN LYMPHOMA, UNSPECIFIED, UNSPECIFIED SITE: ICD-10-CM

## 2018-11-05 DIAGNOSIS — K37 UNSPECIFIED APPENDICITIS: ICD-10-CM

## 2018-11-05 PROCEDURE — 99214 OFFICE O/P EST MOD 30 MIN: CPT

## 2018-11-05 RX ORDER — METOCLOPRAMIDE 10 MG/1
10 TABLET ORAL EVERY 6 HOURS
Qty: 40 | Refills: 2 | Status: DISCONTINUED | COMMUNITY
Start: 2018-09-11 | End: 2018-11-05

## 2018-11-05 NOTE — PHYSICAL EXAM
[No Acute Distress] : no acute distress [Well Nourished] : well nourished [Normal Outer Ear/Nose] : the outer ears and nose were normal in appearance [Normal Oropharynx] : the oropharynx was normal [No JVD] : no jugular venous distention [Supple] : supple [No Respiratory Distress] : no respiratory distress  [Clear to Auscultation] : lungs were clear to auscultation bilaterally [Soft] : abdomen soft [No HSM] : no HSM [de-identified] : drain mid lower abdomen

## 2018-11-05 NOTE — REVIEW OF SYSTEMS
[Fever] : no fever [Night Sweats] : no night sweats [Earache] : no earache [Nasal Discharge] : no nasal discharge [Chest Pain] : no chest pain [Orthopena] : no orthopnea [Shortness Of Breath] : no shortness of breath [Wheezing] : no wheezing [Abdominal Pain] : no abdominal pain [Vomiting] : vomiting

## 2018-11-05 NOTE — PLAN
[FreeTextEntry1] : s/p appendicitis\par need f/u ct scan and surgical f/u\par bipolar off of lithium\par hodgkin disease need eventual treatment\par

## 2018-11-05 NOTE — HISTORY OF PRESENT ILLNESS
[FreeTextEntry1] : Follow up  [de-identified] : Follow up appendicitis and hodkin disease\par s/p surgery\par indwelling drain\par minimal drain need f/u\par off of lithium

## 2018-11-06 ENCOUNTER — APPOINTMENT (OUTPATIENT)
Dept: INFUSION THERAPY | Facility: HOSPITAL | Age: 68
End: 2018-11-06

## 2018-11-06 ENCOUNTER — APPOINTMENT (OUTPATIENT)
Dept: HEMATOLOGY ONCOLOGY | Facility: CLINIC | Age: 68
End: 2018-11-06

## 2019-01-09 ENCOUNTER — APPOINTMENT (OUTPATIENT)
Dept: INTERNAL MEDICINE | Facility: CLINIC | Age: 69
End: 2019-01-09

## 2019-07-03 NOTE — DISCHARGE NOTE ADULT - MEDICATION SUMMARY - MEDICATIONS TO STOP TAKING
verbal cues/1 person assist/nonverbal cues (demo/gestures) I will STOP taking the medications listed below when I get home from the hospital:  None

## 2020-08-12 NOTE — BRIEF OPERATIVE NOTE - COMMENTS
Bilateral rectus sheath block performed by anesthesia at the start of case. Please see their documentation for their description of the procedure.    Wound class IV Arava Counseling:  Patient counseled regarding adverse effects of Arava including but not limited to nausea, vomiting, abnormalities in liver function tests. Patients may develop mouth sores, rash, diarrhea, and abnormalities in blood counts. The patient understands that monitoring is required including LFTs and blood counts.  There is a rare possibility of scarring of the liver and lung problems that can occur when taking methotrexate. Persistent nausea, loss of appetite, pale stools, dark urine, cough, and shortness of breath should be reported immediately. Patient advised to discontinue Arava treatment and consult with a physician prior to attempting conception. The patient will have to undergo a treatment to eliminate Arava from the body prior to conception.

## 2024-05-03 NOTE — ED PROVIDER NOTE - TOBACCO USE
Health Maintenance       DTaP/Tdap/Td Vaccine (2 - Td or Tdap)  Overdue since 9/9/2014    Diabetes Foot Exam (Yearly)  Overdue since 6/1/2017    COVID-19 Vaccine (4 - 2023-24 season)  Overdue since 9/1/2023    Medicare Advantage- Medicare Wellness Visit (Yearly - January to December)  Overdue since 1/1/2024    Diabetes A1C (Every 6 Months)  Ordered on 10/27/2023           Following review of the above:  Patient is not proceeding with: above    Note: Refer to final orders and clinician documentation.       Current every day smoker

## 2025-04-04 NOTE — PATIENT PROFILE ADULT - CENTRAL VENOUS CATHETER/PICC LINE
Patient is scheduled for one of the following low risk procedures: Lumbar Medial Branch Block or Lumbar Radiofrequency Ablation, Peripheral Joint Injections, Peripheral Nerve blocks (including lateral femoral cutaneous nerve), Peripheral Nerve Stimulator Implant, Piriformis Injection, Sacral Lateral Branch Block, Sacroiliac Joint Injection, Trigger Point Injection of the low back.    Spoke with patient, Pre-Op instructions reviewed.     The patient does not have an active infection and has not been receiving antibiotics for at least 7 days. Patient was instructed if they develop infection symptoms or need to start antimicrobials, the procedure may be canceled and they should notify Dr Rdz's office immediately at [phone].    Patient is not having sedation and is diabetic. Patient was informed that they should eat low carbohydrate meals on the day of their procedure and the days surrounding their procedure. If their blood sugar is too high on the day of their procedure then it may be cancelled. Patient was also informed that if they wear a continuous glucose monitor, it may need to be removed during the procedure since the type of x-ray used may interfere with the device.     Patient does not need to hold any medications for this procedure. Patient instructed to take their scheduled medications as prescribed.  If blood pressure is too high then the procedure may be cancelled.     Low risk procedure without sedation: Patient was instructed that they may drive to and from their procedure.     Showering instructions AM of the procedure- no special soap necessary.     Jewelry may need to be removed for this procedure if it interferes with imaging. The patient was instructed that the facility is not responsible for any lost or misplaced valuables if they decide to remove any jewelry.  It is best to leave jewelry and valuables at home.     Patient verbalized understanding.    Kimo Alvarez RN       
no